# Patient Record
Sex: FEMALE | Race: WHITE | NOT HISPANIC OR LATINO | Employment: STUDENT | URBAN - METROPOLITAN AREA
[De-identification: names, ages, dates, MRNs, and addresses within clinical notes are randomized per-mention and may not be internally consistent; named-entity substitution may affect disease eponyms.]

---

## 2017-02-15 ENCOUNTER — OFFICE VISIT (OUTPATIENT)
Dept: INTERNAL MEDICINE | Facility: CLINIC | Age: 47
End: 2017-02-15
Payer: COMMERCIAL

## 2017-02-15 VITALS
HEART RATE: 84 BPM | WEIGHT: 193 LBS | SYSTOLIC BLOOD PRESSURE: 110 MMHG | HEIGHT: 63 IN | DIASTOLIC BLOOD PRESSURE: 72 MMHG | TEMPERATURE: 98.1 F | BODY MASS INDEX: 34.2 KG/M2

## 2017-02-15 DIAGNOSIS — E78.5 HYPERLIPIDEMIA LDL GOAL <130: ICD-10-CM

## 2017-02-15 DIAGNOSIS — Z82.49 FAMILY HISTORY OF ANEURYSM: ICD-10-CM

## 2017-02-15 PROCEDURE — 99214 OFFICE O/P EST MOD 30 MIN: CPT | Performed by: INTERNAL MEDICINE
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
## 2017-02-15 NOTE — PATIENT INSTRUCTIONS
Continue logging and using your Fit Bit.    - Try to keep your fat intake under 30%.    - Try to increase your protein intake to around 25%   - Switch to Fair Life Milk    - Greek yogurt would be another good protein source  Consider metabolic testing in the future to directly measure your caloric needs. It costs roughly $125.     Continue exercising- Interval training on your elliptical is great.  Add in some strength training: [place] workouts or yoga online for even 5 minutes a day will help.    Schedule with the nutritionist- Kelsey Patricia or Brina Minaya are two here who deal with weight loss. Bring your food log when you meet with them.    I recommend talking to Dr. Ruelas about screening for aneurysm     Follow up in 6-8 weeks.      [facility]    If you have any questions regarding to your visit please contact your care team:     Team Pink:   Clinic Hours Telephone Number   Internal Medicine:  Dr. Thao Smith, NP       7am-7pm  Monday - Thursday   7am-5pm  Fridays  [phone]  (Appointment scheduling available 24/7)    Questions about your visit?  Team Line  [phone]   Urgent Care - [place] and [place] - 11am-9pm Monday-Friday Saturday-Sunday- 9am-5pm   [place] - 5pm-9pm Monday-Friday Saturday-Sunday- 9am-5pm  [phone] - [place]   [phone] - [place]       What options do I have for visits at the clinic other than the traditional office visit?  To expand how we care for you, many of our providers are utilizing electronic visits (e-visits) and telephone visits, when medically appropriate, for interactions with their patients rather than a visit in the clinic.   We also offer nurse visits for many medical concerns. Just like any other service, we will bill your insurance company for this type of visit based on time spent on the phone with your provider. Not all insurance companies cover these visits. Please check with your  medical insurance if this type of visit is covered. You will be responsible for any charges that are not paid by your insurance.      E-visits via [employer]:  generally incur a $35.00 fee.  Telephone visits:  Time spent on the phone: *charged based on time that is spent on the phone in increments of 10 minutes. Estimated cost:   5-10 mins $30.00   11-20 mins. $59.00   21-30 mins. $85.00   Use [employer] (secure email communication and access to your chart) to send your primary care provider a message or make an appointment. Ask someone on your Team how to sign up for [employer].    For a Price Quote for your services, please call our [employer] Price Line at [phone].    As always, Thank you for trusting us with your health care needs!

## 2017-02-15 NOTE — MR AVS SNAPSHOT
After Visit Summary   2/15/2017    Monica Burch    MRN: [id number]           Patient Information     Date Of Birth          1970        Visit Information        Provider Department      2/15/2017 11:30 AM Thao Snell MD [facility]        Today's Diagnoses     BMI 34.0-34.9,adult    -  1    Hyperlipidemia LDL goal <130          Care Instructions    Continue logging and using your Fit Bit.    - Try to keep your fat intake under 30%.    - Try to increase your protein intake to around 25%   - Switch to Fair Life Milk    - Greek yogurt would be another good protein source  Consider metabolic testing in the future to directly measure your caloric needs. It costs roughly $125.     Continue exercising- Interval training on your elliptical is great.  Add in some strength training: [place] workouts or yoga online for even 5 minutes a day will help.    Schedule with the nutritionist- Kelsey Patricia or Brina Minaya are two here who deal with weight loss. Bring your food log when you meet with them.    I recommend talking to Dr. Ruelas about screening for aneurysm     Follow up in 6-8 weeks.      [facility]    If you have any questions regarding to your visit please contact your care team:     Team Pink:   Clinic Hours Telephone Number   Internal Medicine:  Dr. Thao Smith, NP       7am-7pm  Monday - Thursday   7am-5pm  Fridays  [phone]  (Appointment scheduling available 24/7)    Questions about your visit?  Team Line  [phone]   Urgent Care - [place] and [place] - 11am-9pm Monday-Friday Saturday-Sunday- 9am-5pm   [place] - 5pm-9pm Monday-Friday Saturday-Sunday- 9am-5pm  [phone] - [place]  [phone] - [place]       What options do I have for visits at the clinic other than the traditional office visit?  To expand how we care for you, many of our providers are utilizing electronic visits  (e-visits) and telephone visits, when medically appropriate, for interactions with their patients rather than a visit in the clinic.   We also offer nurse visits for many medical concerns. Just like any other service, we will bill your insurance company for this type of visit based on time spent on the phone with your provider. Not all insurance companies cover these visits. Please check with your medical insurance if this type of visit is covered. You will be responsible for any charges that are not paid by your insurance.      E-visits via [employer]:  generally incur a $35.00 fee.  Telephone visits:  Time spent on the phone: *charged based on time that is spent on the phone in increments of 10 minutes. Estimated cost:   5-10 mins $30.00   11-20 mins. $59.00   21-30 mins. $85.00   Use [employer] (secure email communication and access to your chart) to send your primary care provider a message or make an appointment. Ask someone on your Team how to sign up for [employer].    For a Price Quote for your services, please call our [employer] Line at [phone].    As always, Thank you for trusting us with your health care needs!            Follow-ups after your visit        Additional Services     NUTRITION REFERRAL       Your provider [place] you [place] [facility] [place] [facility] [phone]   [email or web address]  [facility] [place] [facility] [phone]   [email or web address]    Please be aware that coverage of these services is subject to the terms and limitations of your health insurance plan.  Call member services at your health plan with any benefit or coverage questions.      Please bring the following with you to your appointment:    (1) This referral request  (2) Any documents given to you regarding this referral  (3) Any specific questions you have about diet and/or food choices                  Who to contact     If you have questions or need  "follow up information about today's clinic visit or your schedule please contact [facility] directly at [phone].  Normal or non-critical lab and imaging results will be communicated to you by MyChart, letter or phone within 4 business days after the clinic has received the results. If you do not hear from us within 7 days, please contact the clinic through [employer] or phone. If you have a critical or abnormal lab result, we will notify you by phone as soon as possible.  Submit refill requests through [employer] or call your pharmacy and they will forward the refill request to us. Please allow 3 business days for your refill to be completed.          Additional Information About Your Visit        [employer] Information     [employer] gives you secure access to your electronic health record. If you see a primary care provider, you can also send messages to your care team and make appointments. If you have questions, please call your primary care clinic.  If you do not have a primary care provider, please call [phone] and they will assist you.        Care EveryWhere ID     This is your Care EveryWhere ID. This could be used by other organizations to access your [place] medical records  [id number]        Your Vitals Were     Pulse Temperature Height BMI (Body Mass Index)          84 98.1  F (36.7  C) (Oral) 5' 3.11\" (1.603 m) 34.07 kg/m2         Blood Pressure from Last 3 Encounters:   02/15/17 110/72   04/30/16 105/66   04/20/16 116/70    Weight from Last 3 Encounters:   02/15/17 193 lb (87.5 kg)   04/29/16 211 lb 10.3 oz (96 kg)   04/20/16 204 lb 14.4 oz (92.9 kg)              We Performed the Following     NUTRITION REFERRAL        Primary Care Provider Office Phone # Fax #    John Ruelas [phone]       [address] [place] [address] [place] [id number]        Thank you!     Thank you for choosing [facility]  for your care. Our goal is always to " provide you with excellent care. Hearing back from our patients is one way we can continue to improve our services. Please take a few minutes to complete the written survey that you may receive in the mail after your visit with us. Thank you!             Your Updated Medication List - Protect others around you: Learn how to safely use, store and throw away your medicines at www.disposemymeds.org.          This list is accurate as of: 2/15/17 12:16 PM.  Always use your most recent med list.                   Brand Name Dispense Instructions for use    enoxaparin 40 MG/0.4ML injection    LOVENOX    2 mL    Inject 0.4 mLs (40 mg) Subcutaneous daily Inject 0.4 mLs (40 mg) Subcutaneous daily for 5 days       hydrOXYzine 25 MG tablet    ATARAX    16 tablet    Take 1 tablet (25 mg) by mouth every 6 hours as needed for other (adjuvant pain)       senna-docusate 8.6-50 MG per tablet    SENOKOT-S;PERICOLACE    30 tablet    Take 1-2 tablets by mouth 2 times daily Take while on oral narcotics to prevent or treat constipation.       ZYRTEC ALLERGY PO      Take 10 mg by mouth daily as needed

## 2017-02-15 NOTE — NURSING NOTE
"Chief Complaint   Patient presents with     Consult     Weight Management       Initial /72 (BP Location: Right arm, Patient Position: Chair, Cuff Size: Adult Large)  Pulse 84  Temp 98.1  F (36.7  C) (Oral)  Ht 5' 3.11\" (1.603 m)  Wt 193 lb (87.5 kg)  BMI 34.07 kg/m2 Estimated body mass index is 34.07 kg/(m^2) as calculated from the following:    Height as of this encounter: 5' 3.11\" (1.603 m).    Weight as of this encounter: 193 lb (87.5 kg).  Medication Reconciliation: complete    "

## 2017-02-16 PROBLEM — Z82.49 FAMILY HISTORY OF ANEURYSM: Status: ACTIVE | Noted: 2017-02-16

## 2017-02-16 PROBLEM — E78.5 HYPERLIPIDEMIA LDL GOAL <130: Status: ACTIVE | Noted: 2017-02-16

## 2017-03-08 ENCOUNTER — OFFICE VISIT (OUTPATIENT)
Dept: SURGERY | Facility: CLINIC | Age: 47
End: 2017-03-08
Payer: COMMERCIAL

## 2017-03-08 VITALS
SYSTOLIC BLOOD PRESSURE: 130 MMHG | HEIGHT: 63 IN | HEART RATE: 78 BPM | DIASTOLIC BLOOD PRESSURE: 86 MMHG | BODY MASS INDEX: 33.66 KG/M2 | WEIGHT: 190 LBS

## 2017-03-08 DIAGNOSIS — K43.2 RECURRENT VENTRAL HERNIA: Primary | ICD-10-CM

## 2017-03-08 PROCEDURE — 99214 OFFICE O/P EST MOD 30 MIN: CPT | Performed by: SURGERY

## 2017-03-08 NOTE — LETTER
2017    RE:  Monica Burch-:  [birth year]    Patient is well known to me from multiple prior hernia repairs. Her last procedure was in 2016 and consisted of a combined open and laparoscopic ventral hernia repair. She returns today out of concern for possible recurrent hernia. She describes several episodes in the recent past of a reducible painful mass in the left upper quadrant. She states that this occurs while bending or twisting or straining to have a bowel movement. It induces a modest amount of discomfort. She is able to feel this and states that it is reducible. This has gotten to the size of greater than a large marble on occasion. She denies fevers or chills. She denies other constitutional symptoms. 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 She continues to feel somewhat limited in her ability to perform physical activity And work towards weight loss. She feels that these pains are also hindering her progress.     In the office she was examined. I examined her in the standing as well as supine positions. I was unable to demonstrate this mass that she describes. The area indicated would be lateral to the previous mesh that was placed and is mixed between two separate old trocar sites.     We discussed in detail her management options. I don't believe that there would be any significant benefit to imaging unless the mass were present at the time of imaging. Other option would be to proceed simply with laparoscopy. Patient is extremely reliable and I believe her when she states she has this reducible mass. We discussed this all in great detail. Her questions were all answered. He is going to take this all into consideration. She will call back later with her decision on how she would like to proceed.    Curt León MD

## 2017-03-08 NOTE — MR AVS SNAPSHOT
After Visit Summary   3/8/2017    Monica Burch    MRN: [id number]           Patient Information     Date Of Birth          1970        Visit Information        Provider Department      3/8/2017 2:15 PM Curt León MD [facility]       Follow-ups after your visit        Your next 10 appointments already scheduled     Apr 10, 2017 10:30 AM CDT   Office Visit with Thao Snell MD   [facility] ([address] [address] [phone]           Bring a current list of meds and any records pertaining to this visit.  For Physicals, please bring immunization records and any forms needing to be filled out.  Please arrive 10 minutes early to complete paperwork.              Who to contact     If you have questions or need follow up information about today's clinic visit or your schedule please contact [facility] directly at [phone].  Normal or non-critical lab and imaging results will be communicated to you by [employer], letter or phone within 4 business days after the clinic has received the results. If you do not hear from us within 7 days, please contact the clinic through [employer] or phone. If you have a critical or abnormal lab result, we will notify you by phone as soon as possible.  Submit refill requests through [employer] or call your pharmacy and they will forward the refill request to us. Please allow 3 business days for your refill to be completed.          Additional Information About Your Visit        [employer] Information     [employer] gives you secure access to your electronic health record. If you see a primary care provider, you can also send messages to your care team and make appointments. If you have questions, please call your primary care clinic.  If you do not have a primary care provider, please call [phone] and they will  "assist you.        Care EveryWhere ID     This is your Care EveryWhere ID. This could be used by other organizations to access your [facility] medical records  [id number]        Your Vitals Were     Pulse Height BMI (Body Mass Index)             78 5' 3\" (1.6 m) 33.66 kg/m2          Blood Pressure from Last 3 Encounters:   03/08/17 130/86   02/15/17 110/72   04/30/16 105/66    Weight from Last 3 Encounters:   03/08/17 190 lb (86.2 kg)   02/15/17 193 lb (87.5 kg)   04/29/16 211 lb 10.3 oz (96 kg)              Today, you had the following     No orders found for display       Primary Care Provider Office Phone # Fax #    John Ruelas [phone]       [address] [place] [address] [place] [id number]        Thank you!     Thank you for choosing [facility]  for your care. Our goal is always to provide you with excellent care. Hearing back from our patients is one way we can continue to improve our services. Please take a few minutes to complete the written survey that you may receive in the mail after your visit with us. Thank you!             Your Updated Medication List - Protect others around you: Learn how to safely use, store and throw away your medicines at www.disposemymeds.org.          This list is accurate as of: 3/8/17  2:22 PM.  Always use your most recent med list.                   Brand Name Dispense Instructions for use    enoxaparin 40 MG/0.4ML injection    LOVENOX    2 mL    Inject 0.4 mLs (40 mg) Subcutaneous daily Inject 0.4 mLs (40 mg) Subcutaneous daily for 5 days       hydrOXYzine 25 MG tablet    ATARAX    16 tablet    Take 1 tablet (25 mg) by mouth every 6 hours as needed for other (adjuvant pain)       senna-docusate 8.6-50 MG per tablet    SENOKOT-S;PERICOLACE    30 tablet    Take 1-2 tablets by mouth 2 times daily Take while on oral narcotics to prevent or treat constipation.       ZYRTEC ALLERGY PO      Take 10 mg by mouth daily as needed " Reported on 3/8/2017

## 2017-03-09 NOTE — PROGRESS NOTES
Patient is well known to me from multiple prior hernia repairs. Her last procedure was in April 2016 and consisted of a combined open and laparoscopic ventral hernia repair.  She returns today out of concern for possible recurrent hernia.  She describes several episodes in the recent past of a reducible painful mass in the left upper quadrant.  She states that this occurs while bending or twisting or straining to have a bowel movement.  It induces a modest amount of discomfort.  She is able to feel this and states that it is reducible.   This has gotten to the size of greater than a large marble on occasion.  She denies fevers or chills.  She denies other constitutional symptoms.  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   She continues to feel somewhat limited in her ability to perform physical activity  And work towards weight loss.  She feels that these pains are also hindering her progress.    In the office she was examined.  I examined her in the standing as well as supine positions.  I was unable to demonstrate this mass that she describes.  The area indicated would be lateral to the previous mesh that was placed and is mixed between two separate old trocar sites.    We discussed in detail her management options.  I don't believe that there would be any significant benefit to imaging unless the mass were present at the time of imaging.  Other option would be to proceed simply with laparoscopy.  Patient is extremely reliable and I believe her when she states she has this reducible mass.  We discussed this all in great detail.  Her questions were all answered.  He is going to take this all into consideration.  She will call back later with her decision on how she would like to proceed.  Total time spent was 25 minutes with greater than 50% in face-to-face consultation.    Please route or send letter to:  Primary Care Provider (PCP)

## 2017-03-14 ENCOUNTER — OFFICE VISIT (OUTPATIENT)
Dept: PEDIATRICS | Facility: CLINIC | Age: 47
End: 2017-03-14
Payer: COMMERCIAL

## 2017-03-14 VITALS
HEART RATE: 79 BPM | TEMPERATURE: 97.8 F | WEIGHT: 193.2 LBS | HEIGHT: 63 IN | OXYGEN SATURATION: 99 % | DIASTOLIC BLOOD PRESSURE: 64 MMHG | SYSTOLIC BLOOD PRESSURE: 116 MMHG | BODY MASS INDEX: 34.23 KG/M2

## 2017-03-14 DIAGNOSIS — Z01.818 PREOP GENERAL PHYSICAL EXAM: Primary | ICD-10-CM

## 2017-03-14 DIAGNOSIS — K43.2 RECURRENT VENTRAL HERNIA: ICD-10-CM

## 2017-03-14 DIAGNOSIS — D68.51 FACTOR V LEIDEN MUTATION (H): ICD-10-CM

## 2017-03-14 DIAGNOSIS — Z23 NEED FOR PROPHYLACTIC VACCINATION AND INOCULATION AGAINST INFLUENZA: ICD-10-CM

## 2017-03-14 LAB
ANION GAP SERPL CALCULATED.3IONS-SCNC: 7 MMOL/L (ref 3–14)
BASOPHILS # BLD AUTO: 0 10E9/L (ref 0–0.2)
BASOPHILS NFR BLD AUTO: 0.3 %
BUN SERPL-MCNC: 19 MG/DL (ref 7–30)
CALCIUM SERPL-MCNC: 8.8 MG/DL (ref 8.5–10.1)
CHLORIDE SERPL-SCNC: 105 MMOL/L (ref 94–109)
CO2 SERPL-SCNC: 28 MMOL/L (ref 20–32)
CREAT SERPL-MCNC: 1.2 MG/DL (ref 0.52–1.04)
DIFFERENTIAL METHOD BLD: NORMAL
EOSINOPHIL # BLD AUTO: 0.4 10E9/L (ref 0–0.7)
EOSINOPHIL NFR BLD AUTO: 4.3 %
ERYTHROCYTE [DISTWIDTH] IN BLOOD BY AUTOMATED COUNT: 13.2 % (ref 10–15)
GFR SERPL CREATININE-BSD FRML MDRD: 48 ML/MIN/1.7M2
GLUCOSE SERPL-MCNC: 87 MG/DL (ref 70–99)
HCT VFR BLD AUTO: 43 % (ref 35–47)
HGB BLD-MCNC: 14.7 G/DL (ref 11.7–15.7)
LYMPHOCYTES # BLD AUTO: 2.1 10E9/L (ref 0.8–5.3)
LYMPHOCYTES NFR BLD AUTO: 20.8 %
MCH RBC QN AUTO: 32.5 PG (ref 26.5–33)
MCHC RBC AUTO-ENTMCNC: 34.2 G/DL (ref 31.5–36.5)
MCV RBC AUTO: 95 FL (ref 78–100)
MONOCYTES # BLD AUTO: 0.4 10E9/L (ref 0–1.3)
MONOCYTES NFR BLD AUTO: 3.7 %
NEUTROPHILS # BLD AUTO: 7.2 10E9/L (ref 1.6–8.3)
NEUTROPHILS NFR BLD AUTO: 70.9 %
PLATELET # BLD AUTO: 286 10E9/L (ref 150–450)
POTASSIUM SERPL-SCNC: 3.5 MMOL/L (ref 3.4–5.3)
RBC # BLD AUTO: 4.53 10E12/L (ref 3.8–5.2)
SODIUM SERPL-SCNC: 140 MMOL/L (ref 133–144)
WBC # BLD AUTO: 10.2 10E9/L (ref 4–11)

## 2017-03-14 PROCEDURE — 90686 IIV4 VACC NO PRSV 0.5 ML IM: CPT | Performed by: FAMILY MEDICINE

## 2017-03-14 PROCEDURE — 36415 COLL VENOUS BLD VENIPUNCTURE: CPT | Performed by: FAMILY MEDICINE

## 2017-03-14 PROCEDURE — 80048 BASIC METABOLIC PNL TOTAL CA: CPT | Performed by: FAMILY MEDICINE

## 2017-03-14 PROCEDURE — 99214 OFFICE O/P EST MOD 30 MIN: CPT | Mod: 25 | Performed by: FAMILY MEDICINE

## 2017-03-14 PROCEDURE — 90471 IMMUNIZATION ADMIN: CPT | Performed by: FAMILY MEDICINE

## 2017-03-14 PROCEDURE — 85025 COMPLETE CBC W/AUTO DIFF WBC: CPT | Performed by: FAMILY MEDICINE

## 2017-03-14 ASSESSMENT — PAIN SCALES - GENERAL: PAINLEVEL: NO PAIN (0)

## 2017-03-14 NOTE — PROGRESS NOTES
[address] [address]  [phone]  Dept: [phone]    PRE-OP EVALUATION:  Today's date: 3/14/2017    Monica Burch (: 1970) presents for pre-operative evaluation assessment as requested by Curt León MD.  She requires evaluation and anesthesia risk assessment prior to undergoing surgery/procedure for treatment of hernia repair.  Proposed procedure: DIAGNOSTIC LAPAROSCOPY, POSSIBLE LEFT UPPER QUADRANT HERNIA WITH MESH     Date of Surgery/ Procedure: 3/28/17  Time of Surgery/ Procedure: 7:30am  Hospital/Surgical Facility: [facility]  Fax number for surgical facility: Available electronically, no need to fax  Primary Physician: John Ruelas  Type of Anesthesia Anticipated: General    Patient has a Health Care Directive or Living Will:  NO    Preop Questions 3/11/2017   1.  Do you have a history of heart attack, stroke, stent, bypass or surgery on an artery in the head, neck, heart or legs? No   2.  Do you ever have any pain or discomfort in your chest? No   3.  Do you have a history of  Heart Failure? No   4.   Are you troubled by shortness of breath when:  walking on a level surface, or up a slight hill, or at night? No   5.  Do you currently have a cold, bronchitis or other respiratory infection? No   6.  Do you have a cough, shortness of breath, or wheezing? No   7.  Do you sometimes get pains in the calves of your legs when you walk? No   8. Do you or anyone in your family have previous history of blood clots? YES - mother and sister factor v leiden mutation   9.  Do you or does anyone in your family have a serious bleeding problem such as prolonged bleeding following surgeries or cuts? No   10. Have you ever had problems with anemia or been told to take iron pills? No   11. Have you had any abnormal blood loss such as black, tarry or bloody stools, or abnormal vaginal bleeding? No   12. Have you ever had a  blood transfusion? No   13. Have you or any of your relatives ever had problems with anesthesia? No   14. Do you have sleep apnea, excessive snoring or daytime drowsiness? No   15. Do you have any prosthetic heart valves? No   16. Do you have prosthetic joints? No   17. Is there any chance that you may be pregnant? No         HPI:                                                            See problem list for active medical problems.  Problems all longstanding and stable, except as noted/documented.  See ROS for pertinent symptoms related to these conditions.                                                                                                  .    MEDICAL HISTORY:                                                      Patient Active Problem List    Diagnosis Date Noted     BMI 34.0-34.9,adult 02/16/2017     Priority: Medium     Hyperlipidemia LDL goal <130 02/16/2017     Priority: Medium     Family history of aneurysm 02/16/2017     Priority: Medium     Hernia of abdominal cavity 04/29/2016     Priority: Medium     Factor V Leiden mutation (H) 09/03/2015     Priority: Medium     Labral tear of hip, left 08/28/2015     Priority: Medium     Chronic fatigue 06/30/2015     Priority: Medium     Chronic constipation 06/30/2015     Priority: Medium     LUQ abdominal pain 06/30/2015     Priority: Medium     Obesity (BMI 30-39.9) 06/30/2015     Priority: Medium     Spigelian hernia 02/06/2015     Priority: Medium     Incisional hernia 02/06/2015     Priority: Medium     Recurrent ventral hernia 05/06/2014     Priority: Medium     Recurrent ventral incisional hernia 05/06/2014     Priority: Medium     CARDIOVASCULAR SCREENING; LDL GOAL LESS THAN 160 12/19/2013     Priority: Medium     Body mass index 31.0-31.9, adult 12/12/2013     Priority: Medium     S/P LEEP (status post loop electrosurgical excision procedure) ZURI 3 09/14/2012     Priority: Medium     8/13/12 pap HSIL.  9/12/12 colposcopy. ZURI 3  11/9/12 LEEP.  ZURI 3 with negative margins  3/12/13 pap NIL  10/16/13 pap LSIL/neg HR HPV  11/8/13 colposcopy. WNL  6/30/15 pap ASCUS + HR HPV. Plan: colposcopy       Donor, kidney, PATIENT DONATED ONE KIDNEY TO HER SISTER , 2009 08/13/2012     Priority: Medium     Family history of factor V Leiden mutation 08/13/2012     Priority: Medium     Do you wish to do the replacement in the background? yes          Past Medical History   Diagnosis Date     ASCUS with positive high risk HPV 6/2015     colp 8/2015 ZURI 1     Coagulation disorder (H)      FACTOR V LEIDEN     Factor V Leiden (H)      no known personal history of thrombosis     History of kidney donation      2009 at the [facility] [place], donetd to her sister      PONV (postoperative nausea and vomiting)      Past Surgical History   Procedure Laterality Date     Kidnay surgery  2009     donated kidney - left     C open rx ankle dislocatn+fixatn Right 2001     Conization  11/9/2012     Procedure: CONIZATION;  CONE BIOPSY, DILATION  AND CURETTAGE;  Surgeon: Xenia Miranda MD;  Location: [facility]     Open reduction internal fixation wrist  3/1/2013     Procedure: OPEN REDUCTION INTERNAL FIXATION WRIST;  OPEN REDUCTION INTERNAL FIXATION RIGHT DISTAL RADIUS ;  Surgeon: Thelma Quinones MD;  Location: [facility]     Hernia repair  2011     at the inccisonal site for kidney surgery      Laparoscopic herniorrhaphy ventral  5/6/2014     Procedure: LAPAROSCOPIC HERNIORRHAPHY VENTRAL;  Surgeon: Curt León MD;  Location: [facility]     Herniorrhaphy ventral  5/6/2014     Procedure: HERNIORRHAPHY VENTRAL;  Surgeon: Curt León MD;  Location: [facility]     Laparoscopic herniorrhaphy ventral Right 2/6/2015     Procedure: LAPAROSCOPIC HERNIORRHAPHY VENTRAL;  Surgeon: Curt León MD;  Location: [facility]     Herniorrhaphy inguinal Left 9/8/2015     Procedure: HERNIORRHAPHY INGUINAL;  Surgeon: Curt León MD;  Location: [facility]     Gi surgery       Kidney donation  2009     Herniorrhaphy ventral N/A 4/29/2016     Procedure: HERNIORRHAPHY VENTRAL;  Surgeon: Curt León MD;  Location: [facility]     Laparoscopic herniorrhaphy ventral N/A 4/29/2016     Procedure: LAPAROSCOPIC HERNIORRHAPHY VENTRAL;  Surgeon: Curt León MD;  Location: [facility]     Current Outpatient Prescriptions   Medication Sig Dispense Refill     enoxaparin (LOVENOX) 40 MG/0.4ML injection Inject 0.4 mLs (40 mg) Subcutaneous daily Inject 0.4 mLs (40 mg) Subcutaneous daily for 5 days 2 mL 0     hydrOXYzine (ATARAX) 25 MG tablet Take 1 tablet (25 mg) by mouth every 6 hours as needed for other (adjuvant pain) (Patient not taking: Reported on 3/8/2017) 16 tablet 1     senna-docusate (SENOKOT-S;PERICOLACE) 8.6-50 MG per tablet Take 1-2 tablets by mouth 2 times daily Take while on oral narcotics to prevent or treat constipation. (Patient not taking: Reported on 3/8/2017) 30 tablet 0     Cetirizine HCl (ZYRTEC ALLERGY PO) Take 10 mg by mouth daily as needed Reported on 3/14/2017       OTC products: None, except as noted above    Allergies   Allergen Reactions     Mold      Qsymia [Phentermine-Topiramate]      Twitching in face     Seasonal Allergies      Adhesive Tape Rash      Latex Allergy: NO    Social History   Substance Use Topics     Smoking status: Never Smoker     Smokeless tobacco: Never Used     Alcohol use Yes      Comment: glass of wine 2 times per week      History   Drug Use No       REVIEW OF SYSTEMS:                                                    C: NEGATIVE for fever, chills, change in weight  I: NEGATIVE for worrisome rashes, moles or lesions  E: NEGATIVE for vision changes or irritation  E/M: NEGATIVE for ear, mouth and throat problems  R: NEGATIVE for significant cough or SOB  B: NEGATIVE for masses, tenderness or discharge  CV: NEGATIVE for chest pain, palpitations or peripheral edema  GI: recurrent ventral hernia  : NEGATIVE for frequency, dysuria, or hematuria  M: NEGATIVE  "for significant arthralgias or myalgia  N: NEGATIVE for weakness, dizziness or paresthesias  E: NEGATIVE for temperature intolerance, skin/hair changes  H: NEGATIVE for bleeding problems  P: NEGATIVE for changes in mood or affect    EXAM:                                                    /64  Pulse 79  Temp 97.8  F (36.6  C) (Oral)  Ht 5' 3\" (1.6 m)  Wt 193 lb 3.2 oz (87.6 kg)  SpO2 99%  BMI 34.22 kg/m2    GENERAL APPEARANCE: healthy, alert and no distress     EYES: EOMI, PERRL     HENT: ear canals and TM's normal and nose and mouth without ulcers or lesions     NECK: no adenopathy, no asymmetry, masses, or scars and thyroid normal to palpation     RESP: lungs clear to auscultation - no rales, rhonchi or wheezes     CV: regular rates and rhythm, normal S1 S2, no S3 or S4 and no murmur, click or rub     ABDOMEN: soft, Minimal tenderness over the periumbilical area with no guarding or rigidity, no organomegaly, normal BS, no costovertebral angle tenderness     MS: extremities normal- no gross deformities noted, no evidence of inflammation in joints, FROM in all extremities.     SKIN: no suspicious lesions or rashes     NEURO: Normal strength and tone, sensory exam grossly normal, mentation intact and speech normal     PSYCH: mentation appears normal. and affect normal/bright     LYMPHATICS: No axillary, cervical, or supraclavicular nodes    DIAGNOSTICS:                                                      Results for orders placed or performed in visit on 03/14/17   CBC with platelets and differential   Result Value Ref Range    WBC 10.2 4.0 - 11.0 10e9/L    RBC Count 4.53 3.8 - 5.2 10e12/L    Hemoglobin 14.7 11.7 - 15.7 g/dL    Hematocrit 43.0 35.0 - 47.0 %    MCV 95 78 - 100 fl    MCH 32.5 26.5 - 33.0 pg    MCHC 34.2 31.5 - 36.5 g/dL    RDW 13.2 10.0 - 15.0 %    Platelet Count 286 150 - 450 10e9/L    Diff Method Automated Method     % Neutrophils 70.9 %    % Lymphocytes 20.8 %    % Monocytes 3.7 %    % " Eosinophils 4.3 %    % Basophils 0.3 %    Absolute Neutrophil 7.2 1.6 - 8.3 10e9/L    Absolute Lymphocytes 2.1 0.8 - 5.3 10e9/L    Absolute Monocytes 0.4 0.0 - 1.3 10e9/L    Absolute Eosinophils 0.4 0.0 - 0.7 10e9/L    Absolute Basophils 0.0 0.0 - 0.2 10e9/L   Basic metabolic panel  (Ca, Cl, CO2, Creat, Gluc, K, Na, BUN)   Result Value Ref Range    Sodium 140 133 - 144 mmol/L    Potassium 3.5 3.4 - 5.3 mmol/L    Chloride 105 94 - 109 mmol/L    Carbon Dioxide 28 20 - 32 mmol/L    Anion Gap 7 3 - 14 mmol/L    Glucose 87 70 - 99 mg/dL    Urea Nitrogen 19 7 - 30 mg/dL    Creatinine 1.20 (H) 0.52 - 1.04 mg/dL    GFR Estimate 48 (L) >60 mL/min/1.7m2    GFR Estimate If Black 58 (L) >60 mL/min/1.7m2    Calcium 8.8 8.5 - 10.1 mg/dL         Recent Labs   Lab Test  04/20/16   1319  08/28/15   1500  07/02/15   0901  02/06/15   1630   10/16/13   1157   03/10/11   1142   03/19/09   0749   HGB  14.3  14.0  14.1   --    < >  14.9   < >  14.7   < >  13.1   PLT  280  284  260  264   --    --    < >  305   --   270   INR   --    --    --    --    --    --    --   0.99   --   1.00   NA   --    --   140   --    --   137   < >  141   --   140   POTASSIUM   --    --   4.2   --    --   4.2   < >  4.2   --   3.9   CR   --    --   0.95  1.04   < >  1.04   < >  1.16*   < >  0.64    < > = values in this interval not displayed.        IMPRESSION:                                                    Reason for surgery/procedure: reCurrent ventral hernia//laparoscopic herniorrhaphy  Diagnosis/reason for consult: Preoperative clearance  ASSESSMENT / PLAN:  (Z01.818) Preop general physical exam  (primary encounter diagnosis)  Comment:   Plan: enoxaparin (LOVENOX) 40 MG/0.4ML injection, CBC        with platelets and differential, Basic         metabolic panel  (Ca, Cl, CO2, Creat, Gluc, K,         Na, BUN)        Patient is cleared for the procedure, will start using Lovenox for 5 days after surgery due to underlying history of factor V Leyden  mutation    (K43.2) Recurrent ventral hernia  Comment:   Plan: enoxaparin (LOVENOX) 40 MG/0.4ML injection, CBC        with platelets and differential, Basic         metabolic panel  (Ca, Cl, CO2, Creat, Gluc, K,         Na, BUN)        as above      (D68.51) Factor V Leiden mutation (H)  Comment:   Plan: enoxaparin (LOVENOX) 40 MG/0.4ML injection        as above          The proposed surgical procedure is considered INTERMEDIATE risk.    REVISED CARDIAC RISK INDEX  The patient has the following serious cardiovascular risks for perioperative complications such as (MI, PE, VFib and 3  AV Block):  No serious cardiac risks  INTERPRETATION: 0 risks: Class I (very low risk - 0.4% complication rate)    The patient has the following additional risks for perioperative complications:  No identified additional risks      ICD-10-CM    1. Preop general physical exam Z01.818 enoxaparin (LOVENOX) 40 MG/0.4ML injection     CBC with platelets and differential     Basic metabolic panel  (Ca, Cl, CO2, Creat, Gluc, K, Na, BUN)   2. Recurrent ventral hernia K43.2 enoxaparin (LOVENOX) 40 MG/0.4ML injection     CBC with platelets and differential     Basic metabolic panel  (Ca, Cl, CO2, Creat, Gluc, K, Na, BUN)   3. Factor V Leiden mutation (H) D68.51 enoxaparin (LOVENOX) 40 MG/0.4ML injection   4. Need for prophylactic vaccination and inoculation against influenza Z23 FLU VAC, SPLIT VIRUS IM > 3 YO (QUADRIVALENT) [77234]     Vaccine Administration, Initial [60874]       RECOMMENDATIONS:                                                          --Patient is to take all scheduled medications on the day of surgery EXCEPT for modifications listed below.    APPROVAL GIVEN to proceed with proposed procedure, without further diagnostic evaluation       Signed Electronically by: Mack Almonte MD    Copy of this evaluation report is provided to requesting physician.    [facility] Preop Guidelines  Chart documentation done in part with [employer]  Voice recognition Software. Although reviewed after completion, some word and grammatical error may remain.

## 2017-03-14 NOTE — PROGRESS NOTES
Injectable Influenza Immunization Documentation    1.  Is the person to be vaccinated sick today?  No    2. Does the person to be vaccinated have an allergy to eggs or to a component of the vaccine?  No    3. Has the person to be vaccinated today ever had a serious reaction to influenza vaccine in the past?  No    4. Has the person to be vaccinated ever had Guillain-Tyler syndrome?  No     Form completed by Elke Santiago CMA

## 2017-03-14 NOTE — PATIENT INSTRUCTIONS
Get the labs today  Get the flu shot today  Before Your Surgery      Call your surgeon if there is any change in your health. This includes signs of a cold or flu (such as a sore throat, runny nose, cough, rash or fever).    Do not smoke, drink alcohol or take over the counter medicine (unless your surgeon or primary care doctor tells you to) for the 24 hours before and after surgery.    If you take prescribed drugs: Follow your doctor s orders about which medicines to take and which to stop until after surgery.    Eating and drinking prior to surgery: follow the instructions from your surgeon    Take a shower or bath the night before surgery. Use the soap your surgeon gave you to gently clean your skin. If you do not have soap from your surgeon, use your regular soap. Do not shave or scrub the surgery site.  Wear clean pajamas and have clean sheets on your bed.

## 2017-03-14 NOTE — PROGRESS NOTES
Monica,  Your test results for hemoglobin is normal.   Your kidney functions were slightly worsened from 2 years ago.This needs monitoring  Please have a recheck on the labs on the day of your surgery.   Let me know if you have any questions. Take care.  Mack Almonte MD

## 2017-03-14 NOTE — NURSING NOTE
"Chief Complaint   Patient presents with     Pre-Op Exam       Initial /64  Pulse 79  Temp 97.8  F (36.6  C) (Oral)  Ht 5' 3\" (1.6 m)  Wt 193 lb 3.2 oz (87.6 kg)  SpO2 99%  BMI 34.22 kg/m2 Estimated body mass index is 34.22 kg/(m^2) as calculated from the following:    Height as of this encounter: 5' 3\" (1.6 m).    Weight as of this encounter: 193 lb 3.2 oz (87.6 kg).  BP completed using cuff size: saroj Santiago, CMA    "

## 2017-03-14 NOTE — MR AVS SNAPSHOT
After Visit Summary   3/14/2017    Monica Burch    MRN: [id number]           Patient Information     Date Of Birth          1970        Visit Information        Provider Department      3/14/2017 1:20 PM Mack Almonte MD [facility]        Today's Diagnoses     Preop general physical exam    -  1    Recurrent ventral hernia        Factor V Leiden mutation (H)          Care Instructions    Get the labs today  Get the flu shot today  Before Your Surgery      Call your surgeon if there is any change in your health. This includes signs of a cold or flu (such as a sore throat, runny nose, cough, rash or fever).    Do not smoke, drink alcohol or take over the counter medicine (unless your surgeon or primary care doctor tells you to) for the 24 hours before and after surgery.    If you take prescribed drugs: Follow your doctor s orders about which medicines to take and which to stop until after surgery.    Eating and drinking prior to surgery: follow the instructions from your surgeon    Take a shower or bath the night before surgery. Use the soap your surgeon gave you to gently clean your skin. If you do not have soap from your surgeon, use your regular soap. Do not shave or scrub the surgery site.  Wear clean pajamas and have clean sheets on your bed.         Follow-ups after your visit        Your next 10 appointments already scheduled     Mar 28, 2017  7:30 AM CDT   [facility] Same Day Surgery with Curt León MD   [facility] ([facility])    [facility] ([facility])   [phone]            Mar 28, 2017   Procedure with Curt León MD   [facility] (--)    [address]  [facility] [address]   [phone]            Apr 10, 2017 10:30 AM CDT   Office Visit with Thao Snell MD   [facility] [place]  [facility]    [address] [address] [phone]           Bring a current list of meds and any records pertaining to this visit.  For Physicals, please bring immunization records and any forms needing to be filled out.  Please arrive 10 minutes early to complete paperwork.              Who to contact     If you have questions or need follow up information about today's clinic visit or your schedule please contact [facility] directly at [phone].  Normal or non-critical lab and imaging results will be communicated to you by iClinicalhart, letter or phone within 4 business days after the clinic has received the results. If you do not hear from us within 7 days, please contact the clinic through [employer] Intellig[employer] or phone. If you have a critical or abnormal lab result, we will notify you by phone as soon as possible.  Submit refill requests through Group-IB or call your pharmacy and they will forward the refill request to us. Please allow 3 business days for your refill to be completed.          Additional Information About Your Visit        Group-IB Information     Group-IB gives you secure access to your electronic health record. If you see a primary care provider, you can also send messages to your care team and make appointments. If you have questions, please call your primary care clinic.  If you do not have a primary care provider, please call [phone] and they will assist you.      Group-IB is an electronic gateway that provides easy, online access to your medical records. With Group-IB, you can request a clinic appointment, read your test results, renew a prescription or communicate with your care team.     To access your existing account, please contact your [facility] or call [phone] for assistance.        Care EveryWhere ID     This is your Care EveryWhere ID. This could be used by other organizations to access your [facility]  "records  [id number]        Your Vitals Were     Pulse Temperature Height Pulse Oximetry BMI (Body Mass Index)       79 97.8  F (36.6  C) (Oral) 5' 3\" (1.6 m) 99% 34.22 kg/m2        Blood Pressure from Last 3 Encounters:   03/14/17 116/64   03/08/17 130/86   02/15/17 110/72    Weight from Last 3 Encounters:   03/14/17 193 lb 3.2 oz (87.6 kg)   03/08/17 190 lb (86.2 kg)   02/15/17 193 lb (87.5 kg)              We Performed the Following     Basic metabolic panel  (Ca, Cl, CO2, Creat, Gluc, K, Na, BUN)     CBC with platelets and differential          Where to get your medicines      These medications were sent to [facility] [id number] [place] [address] [address] [facility]  [address], [facility] [address]     Phone:  [phone]     enoxaparin 40 MG/0.4ML injection          Primary Care Provider Office Phone # Fax #    John Ruelas [phone]       [address] [place]   [facility] [address]        Thank you!     Thank you for choosing [facility]  for your care. Our goal is always to provide you with excellent care. Hearing back from our patients is one way we can continue to improve our services. Please take a few minutes to complete the written survey that you may receive in the mail after your visit with us. Thank you!             Your Updated Medication List - Protect others around you: Learn how to safely use, store and throw away your medicines at www.disposemymeds.org.          This list is accurate as of: 3/14/17  1:32 PM.  Always use your most recent med list.                   Brand Name Dispense Instructions for use    enoxaparin 40 MG/0.4ML injection    LOVENOX    2 mL    Inject 0.4 mLs (40 mg) Subcutaneous daily Inject 0.4 mLs (40 mg) Subcutaneous daily for 5 days       hydrOXYzine 25 MG tablet    ATARAX    16 tablet    Take 1 tablet (25 mg) by mouth every 6 hours as needed for other (adjuvant " pain)       senna-docusate 8.6-50 MG per tablet    SENOKOT-S;PERICOLACE    30 tablet    Take 1-2 tablets by mouth 2 times daily Take while on oral narcotics to prevent or treat constipation.       ZYRTEC ALLERGY PO      Take 10 mg by mouth daily as needed Reported on 3/14/2017

## 2017-03-23 NOTE — H&P (VIEW-ONLY)
[address] [address]  [phone]  Dept: [phone]    PRE-OP EVALUATION:  Today's date: 3/14/2017    Monica Burch (: 1970) presents for pre-operative evaluation assessment as requested by Curt León MD.  She requires evaluation and anesthesia risk assessment prior to undergoing surgery/procedure for treatment of hernia repair.  Proposed procedure: DIAGNOSTIC LAPAROSCOPY, POSSIBLE LEFT UPPER QUADRANT HERNIA WITH MESH     Date of Surgery/ Procedure: 3/28/17  Time of Surgery/ Procedure: 7:30am  Hospital/Surgical Facility: [facility]  Fax number for surgical facility: Available electronically, no need to fax  Primary Physician: John Ruelas  Type of Anesthesia Anticipated: General    Patient has a Health Care Directive or Living Will:  NO    Preop Questions 3/11/2017   1.  Do you have a history of heart attack, stroke, stent, bypass or surgery on an artery in the head, neck, heart or legs? No   2.  Do you ever have any pain or discomfort in your chest? No   3.  Do you have a history of  Heart Failure? No   4.   Are you troubled by shortness of breath when:  walking on a level surface, or up a slight hill, or at night? No   5.  Do you currently have a cold, bronchitis or other respiratory infection? No   6.  Do you have a cough, shortness of breath, or wheezing? No   7.  Do you sometimes get pains in the calves of your legs when you walk? No   8. Do you or anyone in your family have previous history of blood clots? YES - mother and sister factor v leiden mutation   9.  Do you or does anyone in your family have a serious bleeding problem such as prolonged bleeding following surgeries or cuts? No   10. Have you ever had problems with anemia or been told to take iron pills? No   11. Have you had any abnormal blood loss such as black, tarry or bloody stools, or abnormal vaginal bleeding? No   12. Have you ever had a  blood transfusion? No   13. Have you or any of your relatives ever had problems with anesthesia? No   14. Do you have sleep apnea, excessive snoring or daytime drowsiness? No   15. Do you have any prosthetic heart valves? No   16. Do you have prosthetic joints? No   17. Is there any chance that you may be pregnant? No         HPI:                                                            See problem list for active medical problems.  Problems all longstanding and stable, except as noted/documented.  See ROS for pertinent symptoms related to these conditions.                                                                                                  .    MEDICAL HISTORY:                                                      Patient Active Problem List    Diagnosis Date Noted     BMI 34.0-34.9,adult 02/16/2017     Priority: Medium     Hyperlipidemia LDL goal <130 02/16/2017     Priority: Medium     Family history of aneurysm 02/16/2017     Priority: Medium     Hernia of abdominal cavity 04/29/2016     Priority: Medium     Factor V Leiden mutation (H) 09/03/2015     Priority: Medium     Labral tear of hip, left 08/28/2015     Priority: Medium     Chronic fatigue 06/30/2015     Priority: Medium     Chronic constipation 06/30/2015     Priority: Medium     LUQ abdominal pain 06/30/2015     Priority: Medium     Obesity (BMI 30-39.9) 06/30/2015     Priority: Medium     Spigelian hernia 02/06/2015     Priority: Medium     Incisional hernia 02/06/2015     Priority: Medium     Recurrent ventral hernia 05/06/2014     Priority: Medium     Recurrent ventral incisional hernia 05/06/2014     Priority: Medium     CARDIOVASCULAR SCREENING; LDL GOAL LESS THAN 160 12/19/2013     Priority: Medium     Body mass index 31.0-31.9, adult 12/12/2013     Priority: Medium     S/P LEEP (status post loop electrosurgical excision procedure) ZURI 3 09/14/2012     Priority: Medium     8/13/12 pap HSIL.  9/12/12 colposcopy. ZURI 3  11/9/12 LEEP.  ZURI 3 with negative margins  3/12/13 pap NIL  10/16/13 pap LSIL/neg HR HPV  11/8/13 colposcopy. WNL  6/30/15 pap ASCUS + HR HPV. Plan: colposcopy       Donor, kidney, PATIENT DONATED ONE KIDNEY TO HER SISTER , 2009 08/13/2012     Priority: Medium     Family history of factor V Leiden mutation 08/13/2012     Priority: Medium     Do you wish to do the replacement in the background? yes          Past Medical History   Diagnosis Date     ASCUS with positive high risk HPV 6/2015     colp 8/2015 ZURI 1     Coagulation disorder (H)      FACTOR V LEIDEN     Factor V Leiden (H)      no known personal history of thrombosis     [place] of [place] [facility] [place] sister      PONV (postoperative nausea and vomiting)      Past Surgical History   Procedure Laterality Date     Kidnay surgery  2009     donated kidney - left     C open rx ankle dislocatn+fixatn Right 2001     Conization  11/9/2012     Procedure: CONIZATION;  CONE BIOPSY, DILATION  AND CURETTAGE;  Surgeon: Xenia Miranda MD;  Location: [facility]     Open reduction internal fixation wrist  3/1/2013     Procedure: OPEN REDUCTION INTERNAL FIXATION WRIST;  OPEN REDUCTION INTERNAL FIXATION RIGHT DISTAL RADIUS ;  Surgeon: Thelma Quinones MD;  Location: [facility]     Hernia repair  2011     at the inccisonal site for kidney surgery      Laparoscopic herniorrhaphy ventral  5/6/2014     Procedure: LAPAROSCOPIC HERNIORRHAPHY VENTRAL;  Surgeon: Curt León MD;  Location: [facility]     Herniorrhaphy ventral  5/6/2014     Procedure: HERNIORRHAPHY VENTRAL;  Surgeon: Curt León MD;  Location: [facility]     Laparoscopic herniorrhaphy ventral Right 2/6/2015     Procedure: LAPAROSCOPIC HERNIORRHAPHY VENTRAL;  Surgeon: Curt León MD;  Location: [facility]     Herniorrhaphy inguinal Left 9/8/2015     Procedure: HERNIORRHAPHY INGUINAL;  Surgeon: Curt León MD;  Location: [facility]     Gi surgery       Kidney donation  2009     Herniorrhaphy ventral N/A 4/29/2016     Procedure: HERNIORRHAPHY VENTRAL;  Surgeon: Curt León MD;  Location: [facility]     Laparoscopic herniorrhaphy ventral N/A 4/29/2016     Procedure: LAPAROSCOPIC HERNIORRHAPHY VENTRAL;  Surgeon: Curt León MD;  Location: [facility]     Current Outpatient Prescriptions   Medication Sig Dispense Refill     enoxaparin (LOVENOX) 40 MG/0.4ML injection Inject 0.4 mLs (40 mg) Subcutaneous daily Inject 0.4 mLs (40 mg) Subcutaneous daily for 5 days 2 mL 0     hydrOXYzine (ATARAX) 25 MG tablet Take 1 tablet (25 mg) by mouth every 6 hours as needed for other (adjuvant pain) (Patient not taking: Reported on 3/8/2017) 16 tablet 1     senna-docusate (SENOKOT-S;PERICOLACE) 8.6-50 MG per tablet Take 1-2 tablets by mouth 2 times daily Take while on oral narcotics to prevent or treat constipation. (Patient not taking: Reported on 3/8/2017) 30 tablet 0     Cetirizine HCl (ZYRTEC ALLERGY PO) Take 10 mg by mouth daily as needed Reported on 3/14/2017       OTC products: None, except as noted above    Allergies   Allergen Reactions     Mold      Qsymia [Phentermine-Topiramate]      Twitching in face     Seasonal Allergies      Adhesive Tape Rash      Latex Allergy: NO    Social History   Substance Use Topics     Smoking status: Never Smoker     Smokeless tobacco: Never Used     Alcohol use Yes      Comment: glass of wine 2 times per week      History   Drug Use No       REVIEW OF SYSTEMS:                                                    C: NEGATIVE for fever, chills, change in weight  I: NEGATIVE for worrisome rashes, moles or lesions  E: NEGATIVE for vision changes or irritation  E/M: NEGATIVE for ear, mouth and throat problems  R: NEGATIVE for significant cough or SOB  B: NEGATIVE for masses, tenderness or discharge  CV: NEGATIVE for chest pain, palpitations or peripheral edema  GI: recurrent ventral hernia  : NEGATIVE for frequency, dysuria, or hematuria  M: NEGATIVE  "for significant arthralgias or myalgia  N: NEGATIVE for weakness, dizziness or paresthesias  E: NEGATIVE for temperature intolerance, skin/hair changes  H: NEGATIVE for bleeding problems  P: NEGATIVE for changes in mood or affect    EXAM:                                                    /64  Pulse 79  Temp 97.8  F (36.6  C) (Oral)  Ht 5' 3\" (1.6 m)  Wt 193 lb 3.2 oz (87.6 kg)  SpO2 99%  BMI 34.22 kg/m2    GENERAL APPEARANCE: healthy, alert and no distress     EYES: EOMI, PERRL     HENT: ear canals and TM's normal and nose and mouth without ulcers or lesions     NECK: no adenopathy, no asymmetry, masses, or scars and thyroid normal to palpation     RESP: lungs clear to auscultation - no rales, rhonchi or wheezes     CV: regular rates and rhythm, normal S1 S2, no S3 or S4 and no murmur, click or rub     ABDOMEN: soft, Minimal tenderness over the periumbilical area with no guarding or rigidity, no organomegaly, normal BS, no costovertebral angle tenderness     MS: extremities normal- no gross deformities noted, no evidence of inflammation in joints, FROM in all extremities.     SKIN: no suspicious lesions or rashes     NEURO: Normal strength and tone, sensory exam grossly normal, mentation intact and speech normal     PSYCH: mentation appears normal. and affect normal/bright     LYMPHATICS: No axillary, cervical, or supraclavicular nodes    DIAGNOSTICS:                                                      Results for orders placed or performed in visit on 03/14/17   CBC with platelets and differential   Result Value Ref Range    WBC 10.2 4.0 - 11.0 10e9/L    RBC Count 4.53 3.8 - 5.2 10e12/L    Hemoglobin 14.7 11.7 - 15.7 g/dL    Hematocrit 43.0 35.0 - 47.0 %    MCV 95 78 - 100 fl    MCH 32.5 26.5 - 33.0 pg    MCHC 34.2 31.5 - 36.5 g/dL    RDW 13.2 10.0 - 15.0 %    Platelet Count 286 150 - 450 10e9/L    Diff Method Automated Method     % Neutrophils 70.9 %    % Lymphocytes 20.8 %    % Monocytes 3.7 %    % " Eosinophils 4.3 %    % Basophils 0.3 %    Absolute Neutrophil 7.2 1.6 - 8.3 10e9/L    Absolute Lymphocytes 2.1 0.8 - 5.3 10e9/L    Absolute Monocytes 0.4 0.0 - 1.3 10e9/L    Absolute Eosinophils 0.4 0.0 - 0.7 10e9/L    Absolute Basophils 0.0 0.0 - 0.2 10e9/L   Basic metabolic panel  (Ca, Cl, CO2, Creat, Gluc, K, Na, BUN)   Result Value Ref Range    Sodium 140 133 - 144 mmol/L    Potassium 3.5 3.4 - 5.3 mmol/L    Chloride 105 94 - 109 mmol/L    Carbon Dioxide 28 20 - 32 mmol/L    Anion Gap 7 3 - 14 mmol/L    Glucose 87 70 - 99 mg/dL    Urea Nitrogen 19 7 - 30 mg/dL    Creatinine 1.20 (H) 0.52 - 1.04 mg/dL    GFR Estimate 48 (L) >60 mL/min/1.7m2    GFR Estimate If Black 58 (L) >60 mL/min/1.7m2    Calcium 8.8 8.5 - 10.1 mg/dL         Recent Labs   Lab Test  04/20/16   1319  08/28/15   1500  07/02/15   0901  02/06/15   1630   10/16/13   1157   03/10/11   1142   03/19/09   0749   HGB  14.3  14.0  14.1   --    < >  14.9   < >  14.7   < >  13.1   PLT  280  284  260  264   --    --    < >  305   --   270   INR   --    --    --    --    --    --    --   0.99   --   1.00   NA   --    --   140   --    --   137   < >  141   --   140   POTASSIUM   --    --   4.2   --    --   4.2   < >  4.2   --   3.9   CR   --    --   0.95  1.04   < >  1.04   < >  1.16*   < >  0.64    < > = values in this interval not displayed.        IMPRESSION:                                                    Reason for surgery/procedure: reCurrent ventral hernia//laparoscopic herniorrhaphy  Diagnosis/reason for consult: Preoperative clearance  ASSESSMENT / PLAN:  (Z01.818) Preop general physical exam  (primary encounter diagnosis)  Comment:   Plan: enoxaparin (LOVENOX) 40 MG/0.4ML injection, CBC        with platelets and differential, Basic         metabolic panel  (Ca, Cl, CO2, Creat, Gluc, K,         Na, BUN)        Patient is cleared for the procedure, will start using Lovenox for 5 days after surgery due to underlying history of factor V Leyden  mutation    (K43.2) Recurrent ventral hernia  Comment:   Plan: enoxaparin (LOVENOX) 40 MG/0.4ML injection, CBC        with platelets and differential, Basic         metabolic panel  (Ca, Cl, CO2, Creat, Gluc, K,         Na, BUN)        as above      (D68.51) Factor V Leiden mutation (H)  Comment:   Plan: enoxaparin (LOVENOX) 40 MG/0.4ML injection        as above          The proposed surgical procedure is considered INTERMEDIATE risk.    REVISED CARDIAC RISK INDEX  The patient has the following serious cardiovascular risks for perioperative complications such as (MI, PE, VFib and 3  AV Block):  No serious cardiac risks  INTERPRETATION: 0 risks: Class I (very low risk - 0.4% complication rate)    The patient has the following additional risks for perioperative complications:  No identified additional risks      ICD-10-CM    1. Preop general physical exam Z01.818 enoxaparin (LOVENOX) 40 MG/0.4ML injection     CBC with platelets and differential     Basic metabolic panel  (Ca, Cl, CO2, Creat, Gluc, K, Na, BUN)   2. Recurrent ventral hernia K43.2 enoxaparin (LOVENOX) 40 MG/0.4ML injection     CBC with platelets and differential     Basic metabolic panel  (Ca, Cl, CO2, Creat, Gluc, K, Na, BUN)   3. Factor V Leiden mutation (H) D68.51 enoxaparin (LOVENOX) 40 MG/0.4ML injection   4. Need for prophylactic vaccination and inoculation against influenza Z23 FLU VAC, SPLIT VIRUS IM > 3 YO (QUADRIVALENT) [40797]     Vaccine Administration, Initial [70550]       RECOMMENDATIONS:                                                          --Patient is to take all scheduled medications on the day of surgery EXCEPT for modifications listed below.    APPROVAL GIVEN to proceed with proposed procedure, without further diagnostic evaluation       Signed Electronically by: Mack Almonte MD    Copy of this evaluation report is provided to requesting physician.    [facility] Preop Guidelines  Chart documentation done in part with [employer]  Voice recognition Software. Although reviewed after completion, some word and grammatical error may remain.

## 2017-03-27 ENCOUNTER — ANESTHESIA EVENT (OUTPATIENT)
Dept: SURGERY | Facility: CLINIC | Age: 47
End: 2017-03-27
Payer: COMMERCIAL

## 2017-03-28 ENCOUNTER — OFFICE VISIT (OUTPATIENT)
Dept: SURGERY | Facility: PHYSICIAN GROUP | Age: 47
End: 2017-03-28
Payer: COMMERCIAL

## 2017-03-28 ENCOUNTER — ANESTHESIA (OUTPATIENT)
Dept: SURGERY | Facility: CLINIC | Age: 47
End: 2017-03-28
Payer: COMMERCIAL

## 2017-03-28 ENCOUNTER — HOSPITAL ENCOUNTER (OUTPATIENT)
Facility: CLINIC | Age: 47
Discharge: HOME | End: 2017-03-28
Attending: SURGERY | Admitting: SURGERY
Payer: COMMERCIAL

## 2017-03-28 VITALS
WEIGHT: 193.38 LBS | BODY MASS INDEX: 34.27 KG/M2 | SYSTOLIC BLOOD PRESSURE: 104 MMHG | HEIGHT: 63 IN | DIASTOLIC BLOOD PRESSURE: 62 MMHG | RESPIRATION RATE: 14 BRPM | OXYGEN SATURATION: 96 % | TEMPERATURE: 97.1 F

## 2017-03-28 DIAGNOSIS — G89.18 POST-OP PAIN: Primary | ICD-10-CM

## 2017-03-28 PROBLEM — R10.9 ABDOMINAL PAIN: Status: ACTIVE | Noted: 2017-03-28

## 2017-03-28 LAB — HCG SERPL QL: NEGATIVE

## 2017-03-28 PROCEDURE — 27210995 ZZH RX 272: Performed by: SURGERY

## 2017-03-28 PROCEDURE — 25000132 ZZH RX MED GY IP 250 OP 250 PS 637: Performed by: PHYSICIAN ASSISTANT

## 2017-03-28 PROCEDURE — 25000128 H RX IP 250 OP 636: Performed by: ANESTHESIOLOGY

## 2017-03-28 PROCEDURE — 37000009 ZZH ANESTHESIA TECHNICAL FEE, EACH ADDTL 15 MIN: Performed by: SURGERY

## 2017-03-28 PROCEDURE — 40000170 ZZH STATISTIC PRE-PROCEDURE ASSESSMENT II: Performed by: SURGERY

## 2017-03-28 PROCEDURE — 25000125 ZZHC RX 250: Performed by: NURSE ANESTHETIST, CERTIFIED REGISTERED

## 2017-03-28 PROCEDURE — 25000128 H RX IP 250 OP 636: Performed by: SURGERY

## 2017-03-28 PROCEDURE — 71000013 ZZH RECOVERY PHASE 1 LEVEL 1 EA ADDTL HR: Performed by: SURGERY

## 2017-03-28 PROCEDURE — 25000128 H RX IP 250 OP 636: Performed by: NURSE ANESTHETIST, CERTIFIED REGISTERED

## 2017-03-28 PROCEDURE — 37000008 ZZH ANESTHESIA TECHNICAL FEE, 1ST 30 MIN: Performed by: SURGERY

## 2017-03-28 PROCEDURE — 25000125 ZZHC RX 250: Performed by: ANESTHESIOLOGY

## 2017-03-28 PROCEDURE — 25000125 ZZHC RX 250: Performed by: SURGERY

## 2017-03-28 PROCEDURE — 25000566 ZZH SEVOFLURANE, EA 15 MIN: Performed by: SURGERY

## 2017-03-28 PROCEDURE — 84703 CHORIONIC GONADOTROPIN ASSAY: CPT | Performed by: ANESTHESIOLOGY

## 2017-03-28 PROCEDURE — 25800025 ZZH RX 258: Performed by: NURSE ANESTHETIST, CERTIFIED REGISTERED

## 2017-03-28 PROCEDURE — 36415 COLL VENOUS BLD VENIPUNCTURE: CPT | Performed by: ANESTHESIOLOGY

## 2017-03-28 PROCEDURE — 44180 LAP ENTEROLYSIS: CPT | Performed by: SURGERY

## 2017-03-28 PROCEDURE — 36000058 ZZH SURGERY LEVEL 3 EA 15 ADDTL MIN: Performed by: SURGERY

## 2017-03-28 PROCEDURE — 71000027 ZZH RECOVERY PHASE 2 EACH 15 MINS: Performed by: SURGERY

## 2017-03-28 PROCEDURE — 36000056 ZZH SURGERY LEVEL 3 1ST 30 MIN: Performed by: SURGERY

## 2017-03-28 PROCEDURE — 27210794 ZZH OR GENERAL SUPPLY STERILE: Performed by: SURGERY

## 2017-03-28 PROCEDURE — 71000012 ZZH RECOVERY PHASE 1 LEVEL 1 FIRST HR: Performed by: SURGERY

## 2017-03-28 PROCEDURE — 25800025 ZZH RX 258: Performed by: ANESTHESIOLOGY

## 2017-03-28 PROCEDURE — 44180 LAP ENTEROLYSIS: CPT | Mod: AS | Performed by: PHYSICIAN ASSISTANT

## 2017-03-28 RX ORDER — NEOSTIGMINE METHYLSULFATE 1 MG/ML
VIAL (ML) INJECTION PRN
Status: DISCONTINUED | OUTPATIENT
Start: 2017-03-28 | End: 2017-03-28

## 2017-03-28 RX ORDER — BUPIVACAINE HYDROCHLORIDE AND EPINEPHRINE 2.5; 5 MG/ML; UG/ML
INJECTION, SOLUTION INFILTRATION; PERINEURAL PRN
Status: DISCONTINUED | OUTPATIENT
Start: 2017-03-28 | End: 2017-03-28 | Stop reason: HOSPADM

## 2017-03-28 RX ORDER — LIDOCAINE HYDROCHLORIDE 20 MG/ML
INJECTION, SOLUTION INFILTRATION; PERINEURAL PRN
Status: DISCONTINUED | OUTPATIENT
Start: 2017-03-28 | End: 2017-03-28

## 2017-03-28 RX ORDER — PROPOFOL 10 MG/ML
INJECTION, EMULSION INTRAVENOUS PRN
Status: DISCONTINUED | OUTPATIENT
Start: 2017-03-28 | End: 2017-03-28

## 2017-03-28 RX ORDER — MAGNESIUM HYDROXIDE 1200 MG/15ML
LIQUID ORAL PRN
Status: DISCONTINUED | OUTPATIENT
Start: 2017-03-28 | End: 2017-03-28 | Stop reason: HOSPADM

## 2017-03-28 RX ORDER — ONDANSETRON 4 MG/1
4 TABLET, ORALLY DISINTEGRATING ORAL EVERY 30 MIN PRN
Status: DISCONTINUED | OUTPATIENT
Start: 2017-03-28 | End: 2017-03-28 | Stop reason: HOSPADM

## 2017-03-28 RX ORDER — HYDROCODONE BITARTRATE AND ACETAMINOPHEN 5; 325 MG/1; MG/1
1-2 TABLET ORAL EVERY 4 HOURS PRN
Qty: 30 TABLET | Refills: 0 | Status: SHIPPED | OUTPATIENT
Start: 2017-03-28

## 2017-03-28 RX ORDER — FENTANYL CITRATE 0.05 MG/ML
25-50 INJECTION, SOLUTION INTRAMUSCULAR; INTRAVENOUS
Status: DISCONTINUED | OUTPATIENT
Start: 2017-03-28 | End: 2017-03-28 | Stop reason: HOSPADM

## 2017-03-28 RX ORDER — SODIUM CHLORIDE, SODIUM LACTATE, POTASSIUM CHLORIDE, CALCIUM CHLORIDE 600; 310; 30; 20 MG/100ML; MG/100ML; MG/100ML; MG/100ML
INJECTION, SOLUTION INTRAVENOUS CONTINUOUS
Status: DISCONTINUED | OUTPATIENT
Start: 2017-03-28 | End: 2017-03-28 | Stop reason: HOSPADM

## 2017-03-28 RX ORDER — DEXAMETHASONE SODIUM PHOSPHATE 4 MG/ML
INJECTION, SOLUTION INTRA-ARTICULAR; INTRALESIONAL; INTRAMUSCULAR; INTRAVENOUS; SOFT TISSUE PRN
Status: DISCONTINUED | OUTPATIENT
Start: 2017-03-28 | End: 2017-03-28

## 2017-03-28 RX ORDER — MEPERIDINE HYDROCHLORIDE 25 MG/ML
12.5 INJECTION INTRAMUSCULAR; INTRAVENOUS; SUBCUTANEOUS
Status: DISCONTINUED | OUTPATIENT
Start: 2017-03-28 | End: 2017-03-28 | Stop reason: HOSPADM

## 2017-03-28 RX ORDER — GLYCOPYRROLATE 0.2 MG/ML
INJECTION, SOLUTION INTRAMUSCULAR; INTRAVENOUS PRN
Status: DISCONTINUED | OUTPATIENT
Start: 2017-03-28 | End: 2017-03-28

## 2017-03-28 RX ORDER — ONDANSETRON 2 MG/ML
4 INJECTION INTRAMUSCULAR; INTRAVENOUS EVERY 30 MIN PRN
Status: DISCONTINUED | OUTPATIENT
Start: 2017-03-28 | End: 2017-03-28 | Stop reason: HOSPADM

## 2017-03-28 RX ORDER — NALOXONE HYDROCHLORIDE 0.4 MG/ML
.1-.4 INJECTION, SOLUTION INTRAMUSCULAR; INTRAVENOUS; SUBCUTANEOUS
Status: DISCONTINUED | OUTPATIENT
Start: 2017-03-28 | End: 2017-03-28 | Stop reason: HOSPADM

## 2017-03-28 RX ORDER — FENTANYL CITRATE 50 UG/ML
INJECTION, SOLUTION INTRAMUSCULAR; INTRAVENOUS PRN
Status: DISCONTINUED | OUTPATIENT
Start: 2017-03-28 | End: 2017-03-28

## 2017-03-28 RX ORDER — CEFAZOLIN SODIUM 1 G/3ML
1 INJECTION, POWDER, FOR SOLUTION INTRAMUSCULAR; INTRAVENOUS SEE ADMIN INSTRUCTIONS
Status: DISCONTINUED | OUTPATIENT
Start: 2017-03-28 | End: 2017-03-28 | Stop reason: HOSPADM

## 2017-03-28 RX ORDER — SODIUM CHLORIDE, SODIUM LACTATE, POTASSIUM CHLORIDE, CALCIUM CHLORIDE 600; 310; 30; 20 MG/100ML; MG/100ML; MG/100ML; MG/100ML
INJECTION, SOLUTION INTRAVENOUS CONTINUOUS PRN
Status: DISCONTINUED | OUTPATIENT
Start: 2017-03-28 | End: 2017-03-28

## 2017-03-28 RX ORDER — CEFAZOLIN SODIUM 2 G/100ML
2 INJECTION, SOLUTION INTRAVENOUS
Status: COMPLETED | OUTPATIENT
Start: 2017-03-28 | End: 2017-03-28

## 2017-03-28 RX ORDER — ONDANSETRON 2 MG/ML
INJECTION INTRAMUSCULAR; INTRAVENOUS PRN
Status: DISCONTINUED | OUTPATIENT
Start: 2017-03-28 | End: 2017-03-28

## 2017-03-28 RX ORDER — HYDROCODONE BITARTRATE AND ACETAMINOPHEN 5; 325 MG/1; MG/1
1-2 TABLET ORAL
Status: COMPLETED | OUTPATIENT
Start: 2017-03-28 | End: 2017-03-28

## 2017-03-28 RX ORDER — PROPOFOL 10 MG/ML
INJECTION, EMULSION INTRAVENOUS CONTINUOUS PRN
Status: DISCONTINUED | OUTPATIENT
Start: 2017-03-28 | End: 2017-03-28

## 2017-03-28 RX ADMIN — GLYCOPYRROLATE 0.4 MG: 0.2 INJECTION, SOLUTION INTRAMUSCULAR; INTRAVENOUS at 08:08

## 2017-03-28 RX ADMIN — ROCURONIUM BROMIDE 50 MG: 10 INJECTION INTRAVENOUS at 07:24

## 2017-03-28 RX ADMIN — FENTANYL CITRATE 50 MCG: 50 INJECTION, SOLUTION INTRAMUSCULAR; INTRAVENOUS at 08:37

## 2017-03-28 RX ADMIN — SODIUM CHLORIDE, POTASSIUM CHLORIDE, SODIUM LACTATE AND CALCIUM CHLORIDE: 600; 310; 30; 20 INJECTION, SOLUTION INTRAVENOUS at 09:23

## 2017-03-28 RX ADMIN — DEXAMETHASONE SODIUM PHOSPHATE 4 MG: 4 INJECTION, SOLUTION INTRAMUSCULAR; INTRAVENOUS at 07:31

## 2017-03-28 RX ADMIN — SODIUM CHLORIDE, POTASSIUM CHLORIDE, SODIUM LACTATE AND CALCIUM CHLORIDE: 600; 310; 30; 20 INJECTION, SOLUTION INTRAVENOUS at 07:24

## 2017-03-28 RX ADMIN — FENTANYL CITRATE 50 MCG: 50 INJECTION, SOLUTION INTRAMUSCULAR; INTRAVENOUS at 07:26

## 2017-03-28 RX ADMIN — LIDOCAINE HYDROCHLORIDE 60 MG: 20 INJECTION, SOLUTION INFILTRATION; PERINEURAL at 07:23

## 2017-03-28 RX ADMIN — CEFAZOLIN SODIUM 2 G: 2 INJECTION, SOLUTION INTRAVENOUS at 07:31

## 2017-03-28 RX ADMIN — FENTANYL CITRATE 50 MCG: 50 INJECTION, SOLUTION INTRAMUSCULAR; INTRAVENOUS at 07:52

## 2017-03-28 RX ADMIN — HYDROCODONE BITARTRATE AND ACETAMINOPHEN 1 TABLET: 5; 325 TABLET ORAL at 09:49

## 2017-03-28 RX ADMIN — FENTANYL CITRATE 100 MCG: 50 INJECTION, SOLUTION INTRAMUSCULAR; INTRAVENOUS at 07:21

## 2017-03-28 RX ADMIN — ONDANSETRON 4 MG: 2 INJECTION INTRAMUSCULAR; INTRAVENOUS at 08:23

## 2017-03-28 RX ADMIN — PROPOFOL 100 MCG/KG/MIN: 10 INJECTION, EMULSION INTRAVENOUS at 07:23

## 2017-03-28 RX ADMIN — HYDROMORPHONE HYDROCHLORIDE 0.5 MG: 1 INJECTION, SOLUTION INTRAMUSCULAR; INTRAVENOUS; SUBCUTANEOUS at 09:01

## 2017-03-28 RX ADMIN — NEOSTIGMINE METHYLSULFATE 3 MG: 1 INJECTION INTRAMUSCULAR; INTRAVENOUS; SUBCUTANEOUS at 08:08

## 2017-03-28 RX ADMIN — ONDANSETRON 4 MG: 2 INJECTION INTRAMUSCULAR; INTRAVENOUS at 08:05

## 2017-03-28 RX ADMIN — PROPOFOL 150 MG: 10 INJECTION, EMULSION INTRAVENOUS at 07:23

## 2017-03-28 RX ADMIN — MIDAZOLAM HYDROCHLORIDE 2 MG: 1 INJECTION, SOLUTION INTRAMUSCULAR; INTRAVENOUS at 07:20

## 2017-03-28 ASSESSMENT — ENCOUNTER SYMPTOMS: SEIZURES: 0

## 2017-03-28 ASSESSMENT — ACTIVITIES OF DAILY LIVING (ADL)
RETIRED_COMMUNICATION: 0-->UNDERSTANDS/COMMUNICATES WITHOUT DIFFICULTY
FALL_HISTORY_WITHIN_LAST_SIX_MONTHS: NO

## 2017-03-28 ASSESSMENT — LIFESTYLE VARIABLES: TOBACCO_USE: 0

## 2017-03-28 NOTE — IP AVS SNAPSHOT
MRN:[id number]                      After Visit Summary   3/28/2017    Monica Burch    MRN: [id number]           Thank you!     Thank you for choosing [facility] for your care. Our goal is always to provide you with excellent care. Hearing back from our patients is one way we can continue to improve our services. Please take a few minutes to complete the written survey that you may receive in the mail after you visit with us. Thank you!        Patient Information     Date Of Birth          1970        About your hospital stay     You were admitted on:  March 28, 2017 You last received care in the:  [facility] PACU    You were discharged on:  March 28, 2017       Who to Call     For medical emergencies, please call 911.  For non-urgent questions about your medical care, please call your primary care provider or clinic, [phone]  For questions related to your surgery, please call your surgery clinic        Attending Provider     Provider Specialty    Curt León MD General Surgery       Primary Care Provider Office Phone # Fax #    John Ruelas [phone]       [address] [place] [address] [place] [id number]        Your next 10 appointments already scheduled     Apr 10, 2017 10:30 AM CDT   Office Visit with Thao Snell MD   [facility] ([facility])    [address] [place] [id number] [phone]           Bring a current list of meds and any records pertaining to this visit.  For Physicals, please bring immunization records and any forms needing to be filled out.  Please arrive 10 minutes early to complete paperwork.              Further instructions from your care team       [facility]  Discharge Instructions: Post-Operative     DIET    Return to the diet you were on before surgery.    Suggest plenty of liquids and high fiber foods to keep  stools soft.      May take 1 oz. (2 tablespoons) Milk of Magnesia the evening following surgery to encourage bowel movement.    BANDAGE    If you have tape strips, leave them on.  If they become loose, take them off.     Apply ice to incision periodically during the first 48 hours after surgery.    SHOWER    You may shower tomorrow.  Pat the incision dry.    ACTIVITY    You may do what is comfortable.    It is not wise to lift weights, or do anything that requires maximal physical effort for several weeks. Avoid heavy lifting > 20 lbs and strenuous physical activity x 2-3 weeks.       Individual restrictions should be discussed with your surgeon.    You may drive when you are comfortable enough to drive safely and no longer on pain meds.  (Usually 3-4 days.)    WORK      You may return to non-physical work whenever you are comfortable.  (If you can drive, you probably can work.)  Physical work will be decided individually.    PAIN    You may have post-operative pain for several days.    Use the pain medication as directed at your discretion.    Expect gradual resolution of pain over several days.    Do not drink alcohol while you are taking pain medications.    You may take ibuprofen instead of or in addition to your prescription.  If you are taking the maximum amount of your prescription medication, you should not take any additional Tylenol.    EXPECTATIONS    Occasionally with laparoscopic surgery, patients can experience pain into their shoulders or upper back due to gas being trapped in the abdomen. This may take up to 48 hours to subside.    RETURN APPOINTMENT    Please make your post-operative appointment for 10-15 days after surgery.      We recommend making this appointment soon after your surgery date has been confirmed.    CALL OUR CLINICAL OFFICE AT [phone] IF YOU HAVE:    Fever or chills    Drainage from the incision(s)    Significant bleeding    Increasing pain after the first 36 hours    Any  other concerns or questions                            488201 OCT/2009        Same Day Surgery Discharge Instructions for  Sedation and General Anesthesia       It's not unusual to feel dizzy, light-headed or faint for up to 24 hours after surgery or while taking pain medication.  If you have these symptoms: sit for a few minutes before standing and have someone assist you when you get up to walk or use the bathroom.      You should rest and relax for the next 24 hours. We recommend you make arrangements to have an adult stay with you for at least 24 hours after your discharge.  Avoid hazardous and strenuous activity.      DO NOT DRIVE any vehicle or operate mechanical equipment for 24 hours following the end of your surgery.  Even though you may feel normal, your reactions may be affected by the medication you have received.      Do not drink alcoholic beverages for 24 hours following surgery.       Slowly progress to your regular diet as you feel able. It's not unusual to feel nauseated and/or vomit after receiving anesthesia.  If you develop these symptoms, drink clear liquids (apple juice, ginger ale, broth, 7-up, etc. ) until you feel better.  If your nausea and vomiting persists for 24 hours, please notify your surgeon.        All narcotic pain medications, along with inactivity and anesthesia, can cause constipation. Drinking plenty of liquids and increasing fiber intake will help.      For any questions of a medical nature, call your surgeon.      Do not make important decisions for 24 hours.      If you had general anesthesia, you may have a sore throat for a couple of days related to the breathing tube used during surgery.  You may use Cepacol lozenges to help with this discomfort.  If it worsens or if you develop a fever, contact your surgeon.       If you feel your pain is not well managed with the pain medications prescribed by your surgeon, please contact your surgeon's office to let them know so they  "can address your concerns.           Pending Results     No orders found from 3/26/2017 to 3/29/2017.            Admission Information     Date & Time Provider Department Dept. Phone    3/28/2017 Curt León MD [facility] PACU [phone]      Your Vitals Were     Blood Pressure Temperature Respirations Height Weight Last Period    104/62 96.8  F (36  C) 14 1.6 m (5' 3\") 87.7 kg (193 lb 6 oz) 03/20/2017    Pulse Oximetry BMI (Body Mass Index)                96% 34.25 kg/m2          MyChart Information     [employer] gives you secure access to your electronic health record. If you see a primary care provider, you can also send messages to your care team and make appointments. If you have questions, please call your primary care clinic.  If you do not have a primary care provider, please call [phone] and they will assist you.        Care EveryWhere ID     This is your Care EveryWhere ID. This could be used by other organizations to access your [place] medical records  [id number]           Review of your medicines      START taking        Dose / Directions    HYDROcodone-acetaminophen 5-325 MG per tablet   Commonly known as:  NORCO   Used for:  Post-op pain   Notes to Patient:  Had one tab at 9:50 AM        Dose:  1-2 tablet   Take 1-2 tablets by mouth every 4 hours as needed for other (Moderate to Severe Pain)   Quantity:  30 tablet   Refills:  0         CONTINUE these medicines which have NOT CHANGED        Dose / Directions    ZYRTEC ALLERGY PO        Dose:  10 mg   Take 10 mg by mouth daily as needed Reported on 3/14/2017   Refills:  0         STOP taking     diphenhydrAMINE-acetaminophen  MG tablet   Commonly known as:  TYLENOL PM                Where to get your medicines      Some of these will need a paper prescription and others can be bought over the counter. Ask your nurse if you have questions.     Bring a paper prescription for each of these medications     " HYDROcodone-acetaminophen 5-325 MG per tablet                Protect others around you: Learn how to safely use, store and throw away your medicines at www.disposemymeds.org.             Medication List: This is a list of all your medications and when to take them. Check marks below indicate your daily home schedule. Keep this list as a reference.      Medications           Morning Afternoon Evening Bedtime As Needed    HYDROcodone-acetaminophen 5-325 MG per tablet   Commonly known as:  NORCO   Take 1-2 tablets by mouth every 4 hours as needed for other (Moderate to Severe Pain)   Last time this was given:  1 tablet on 3/28/2017  9:49 AM   Notes to Patient:  Had one tab at 9:50 AM                                ZYRTEC ALLERGY PO   Take 10 mg by mouth daily as needed Reported on 3/14/2017

## 2017-03-28 NOTE — ANESTHESIA PREPROCEDURE EVALUATION
Anesthesia Evaluation     . Pt has had prior anesthetic.     History of anesthetic complications   - PONV        ROS/MED HX    ENT/Pulmonary:      (-) tobacco use and sleep apnea   Neurologic:      (-) seizures and CVA   Cardiovascular:        (-) hypertension and OLIVAS   METS/Exercise Tolerance:  >4 METS   Hematologic: Comments: Factor V Leiden        Musculoskeletal:         GI/Hepatic:        (-) GERD and liver disease   Renal/Genitourinary: Comment: Kidney doner        Endo:     (+) Obesity, .   (-) Type II DM, thyroid disease and chronic steroid usage   Psychiatric:         Infectious Disease:        (-) Recent Fever   Malignancy:         Other:                     Physical Exam  Normal systems: cardiovascular, pulmonary and dental    Airway   Mallampati: II  TM distance: >3 FB  Neck ROM: full    Dental     Cardiovascular       Pulmonary                     Anesthesia Plan      History & Physical Review  History and physical reviewed and following examination; no interval change.    ASA Status:  2 .    NPO Status:  > 8 hours    Plan for General and ETT with Propofol induction. Maintenance will be TIVA.    PONV prophylaxis:  Ondansetron (or other 5HT-3) and Dexamethasone or Solumedrol       Postoperative Care  Postoperative pain management:  IV analgesics.      Consents  Anesthetic plan, risks, benefits and alternatives discussed with:  Patient..                          .

## 2017-03-28 NOTE — BRIEF OP NOTE
General Surgery Brief Operative Note    Pre-operative diagnosis: ABDOMINAL PAIN, POSSIBLE HERNIA   Post-operative diagnosis Same   Procedure: Procedure(s):  DIAGNOSTIC LAPAROSCOPY, LAPAROSCOPIC LYSIS OF ADHESIONS     - Wound Class: I-Clean   - Wound Class: I-Clean    Surgeon(s), Assistant(s): Surgeon(s) and Role:     * Curt León MD - Primary     * Alfreda Hutchinson PA-C - Assisting   Estimated blood loss: 10 mL   Drains: None   Specimens: * No specimens in log *   Findings: Adhesions, no evidence of hernia   Condition: Stable   Comments:      Alfreda Hutchinson PA-C See dictated operative report for full details

## 2017-03-28 NOTE — DISCHARGE INSTRUCTIONS
[facility]  Discharge Instructions: Post-Operative     DIET    Return to the diet you were on before surgery.    Suggest plenty of liquids and high fiber foods to keep stools soft.      May take 1 oz. (2 tablespoons) Milk of Magnesia the evening following surgery to encourage bowel movement.    BANDAGE    If you have tape strips, leave them on.  If they become loose, take them off.     Apply ice to incision periodically during the first 48 hours after surgery.    SHOWER    You may shower tomorrow.  Pat the incision dry.    ACTIVITY    You may do what is comfortable.    It is not wise to lift weights, or do anything that requires maximal physical effort for several weeks. Avoid heavy lifting > 20 lbs and strenuous physical activity x 2-3 weeks.       Individual restrictions should be discussed with your surgeon.    You may drive when you are comfortable enough to drive safely and no longer on pain meds.  (Usually 3-4 days.)    WORK      You may return to non-physical work whenever you are comfortable.  (If you can drive, you probably can work.)  Physical work will be decided individually.    PAIN    You may have post-operative pain for several days.    Use the pain medication as directed at your discretion.    Expect gradual resolution of pain over several days.    Do not drink alcohol while you are taking pain medications.    You may take ibuprofen instead of or in addition to your prescription.  If you are taking the maximum amount of your prescription medication, you should not take any additional Tylenol.    EXPECTATIONS    Occasionally with laparoscopic surgery, patients can experience pain into their shoulders or upper back due to gas being trapped in the abdomen. This may take up to 48 hours to subside.    RETURN APPOINTMENT    Please make your post-operative appointment for 10-15 days after surgery.      We recommend making this appointment soon after your surgery date  has been confirmed.    CALL OUR CLINICAL OFFICE AT [phone] IF YOU HAVE:    Fever or chills    Drainage from the incision(s)    Significant bleeding    Increasing pain after the first 36 hours    Any other concerns or questions                            603014 OCT/2009        Same Day Surgery Discharge Instructions for  Sedation and General Anesthesia       It's not unusual to feel dizzy, light-headed or faint for up to 24 hours after surgery or while taking pain medication.  If you have these symptoms: sit for a few minutes before standing and have someone assist you when you get up to walk or use the bathroom.      You should rest and relax for the next 24 hours. We recommend you make arrangements to have an adult stay with you for at least 24 hours after your discharge.  Avoid hazardous and strenuous activity.      DO NOT DRIVE any vehicle or operate mechanical equipment for 24 hours following the end of your surgery.  Even though you may feel normal, your reactions may be affected by the medication you have received.      Do not drink alcoholic beverages for 24 hours following surgery.       Slowly progress to your regular diet as you feel able. It's not unusual to feel nauseated and/or vomit after receiving anesthesia.  If you develop these symptoms, drink clear liquids (apple juice, ginger ale, broth, 7-up, etc. ) until you feel better.  If your nausea and vomiting persists for 24 hours, please notify your surgeon.        All narcotic pain medications, along with inactivity and anesthesia, can cause constipation. Drinking plenty of liquids and increasing fiber intake will help.      For any questions of a medical nature, call your surgeon.      Do not make important decisions for 24 hours.      If you had general anesthesia, you may have a sore throat for a couple of days related to the breathing tube used during surgery.  You may use Cepacol lozenges to help with this discomfort.  If it worsens or if  you develop a fever, contact your surgeon.       If you feel your pain is not well managed with the pain medications prescribed by your surgeon, please contact your surgeon's office to let them know so they can address your concerns.

## 2017-03-28 NOTE — ANESTHESIA CARE TRANSFER NOTE
Patient: Monica Burch    Procedure(s):  DIAGNOSTIC LAPAROSCOPY, LAPAROSCOPIC LYSIS OF ADHESIONS     - Wound Class: I-Clean   - Wound Class: I-Clean    Diagnosis: ABDOMINAL PAIN, POSSIBLE HERNIA  Diagnosis Additional Information: No value filed.    Anesthesia Type:   General     Note:  Airway :Face Mask  Patient transferred to:PACU  Comments: Pt to PACU, face mask on, spont respirations, vss. Report to RN      Vitals: (Last set prior to Anesthesia Care Transfer)    CRNA VITALS  3/28/2017 0755 - 3/28/2017 0828      3/28/2017             EKG: Sinus rhythm                Electronically Signed By: LUIS ANGEL Rincon CRNA  March 28, 2017  8:28 AM

## 2017-03-28 NOTE — ANESTHESIA POSTPROCEDURE EVALUATION
Patient: Monica Burch    Procedure(s):  DIAGNOSTIC LAPAROSCOPY, LAPAROSCOPIC LYSIS OF ADHESIONS     - Wound Class: I-Clean   - Wound Class: I-Clean    Diagnosis:ABDOMINAL PAIN, POSSIBLE HERNIA  Diagnosis Additional Information: No value filed.    Anesthesia Type:  General    Note:  Anesthesia Post Evaluation    Patient location during evaluation: PACU  Patient participation: Able to fully participate in evaluation  Level of consciousness: awake and alert  Pain management: satisfactory to patient  Airway patency: patent  Cardiovascular status: hemodynamically stable  Respiratory status: acceptable and unassisted  Hydration status: balanced  PONV: none     Anesthetic complications: None          Last vitals:  Vitals:    03/28/17 0930 03/28/17 0945 03/28/17 0949   BP: 110/77 104/62    Resp: 16 14    Temp:   36.2  C (97.1  F)   SpO2: 100% 96%          Electronically Signed By: Jarvis Ydoer MD  March 28, 2017  1:47 PM

## 2017-03-28 NOTE — OP NOTE
DATE OF PROCEDURE:  03/28/2017      PREOPERATIVE DIAGNOSIS:  Left upper quadrant and left lower abdominal wall pain with questionable reducible mass, history of multiple prior hernia repairs.      POSTOPERATIVE DIAGNOSES:     1.  Left upper quadrant and left lower abdominal wall pain with questionable reducible mass, history of multiple prior hernia repairs.     2.  No identifiable recurrent ventral hernia and intra-abdominal adhesions.      PROCEDURES:   1.  Diagnostic laparoscopy.   2.  Lysis of intra-abdominal adhesions.      SURGEONS:  Curt León MD      ASSISTANT:  Alfreda Hutchinson PA-C      ANESTHESIA:  General endotracheal.      ESTIMATED BLOOD LOSS:  10 mL.      DRAINS:  None.      COMPLICATIONS:  None.      SPECIMENS:  None.      INDICATIONS:  Monica Burch is a 46-year-old female known to me from multiple prior ventral/abdominal wall hernia repairs.  She presented to my office recently with what she described as a reducible mass in her left upper quadrant pain.  She also had had some pain in her left lower quadrant.  Due to her relatively precise description of what sounded like a recurrent hernia we discussed management options.  We ultimately elected to proceed with laparoscopy.  The potential risks of bleeding, infection, visceral injury, recurrent hernia or ongoing pain were all reviewed in detail.  Questions were all answered and she wished to proceed.      DESCRIPTION OF PROCEDURE:  After informed consent was obtained, the patient was taken to the operating room and placed supine on the operating table.  Following the induction of adequate general endotracheal anesthesia, the patient's abdomen was prepped and draped in the usual fashion.  Surgeon initiated timeout was then completed and IV antibiotics were administered.  A stab incision was made in the right lateral abdomen through which a 5 mm Optiview trocar was used to gain access to the peritoneal cavity.  A carbon dioxide pneumoperitoneum was  then established.  A 5 mm 30 degree laparoscope was advanced through the trocar.  There were multiple anterior abdominal wall adhesions particularly in the upper abdomen as well as some loops of bowel adhesions in the lower abdomen.  Under direct vision, a 5 mm port was placed in the right lower abdomen.  I could then visualize across the left side.  An additional 5 mm port was placed in the left lower abdomen.  Specifically, the hernia mesh that had been placed in the left lateral abdomen appeared to be intact with no evidence of recurrent hernia.  There was a solitary loop of small bowel adherent to the mesh in the right mid abdomen that was taken down sharply.  There was no evidence of visceral injury.  In the upper abdomen, there were modest omental adhesions to the prior meshes that had been used for her previous hernia repairs.  A combination of blunt as well as electrocautery dissection was used to completely take down all these adhesions completely exposing the prior hernia repairs.  After all the adhesions were taken down, there was no evidence of any type of abdominal wall defect.  There was no evidence of recurrent hernia.  The left lower abdomen was also visualized.  There were no significant intra-abdominal adhesions in the left lower abdomen, no evidence of hernia.  We therefore at this point elected to conclude the procedure.  Trocars were removed.  Carbon dioxide was massaged from the abdomen.  Local anesthetic was injected and the skin incisions were closed with subcuticular 4-0 Vicryl stitches.  Steri-Strips and appropriate dressings were applied.  The patient tolerated this without difficulty.  She was awakened in the operating room, extubated and taken to recovery room in stable condition.  Physician's assistant first assist was necessary for the performance this procedure due to expertise in patient positioning, prepping, draping, camera operation, exposure and visualization, incisional closure.          KARL JOHNSON MD             D: 2017 12:16   T: 2017 17:36   MT: [id number]      Name:     RASHAD DARDEN   MRN:      [id number]        Account:        [id number]   :      1970           Procedure Date: 2017      Document: [id number]

## 2017-03-28 NOTE — IP AVS SNAPSHOT
[address], [address]    [facility] [address]    Phone:  [phone]                                       After Visit Summary   3/28/2017    Monica Burch    MRN: [id number]           After Visit Summary Signature Page     I have received my discharge instructions, and my questions have been answered. I have discussed any challenges I see with this plan with the nurse or doctor.    ..........................................................................................................................................  Patient/Patient Representative Signature      ..........................................................................................................................................  Patient Representative Print Name and Relationship to Patient    ..................................................               ................................................  Date                                            Time    ..........................................................................................................................................  Reviewed by Signature/Title    ...................................................              ..............................................  Date                                                            Time

## 2017-03-28 NOTE — PROGRESS NOTES
Admission medication history interview status for the 3/28/2017  admission is complete. See EPIC admission navigator for prior to admission medications     Medication history source reliability:Good    Medication history interview source(s):Patient    Medication history resources (including written lists, pill bottles, clinic record):None    Primary pharmacy.[facility]    Additional medication history information not noted on PTA med list :None    Time spent in this activity: 45 minutes    Prior to Admission medications    Medication Sig Last Dose Taking? Auth Provider   diphenhydrAMINE-acetaminophen (TYLENOL PM)  MG tablet Take 1 tablet by mouth nightly as needed for sleep 3/24/2017 at prn Yes Reported, Patient   Cetirizine HCl (ZYRTEC ALLERGY PO) Take 10 mg by mouth daily as needed Reported on 3/14/2017 more than a week at prn  Reported, Patient

## 2017-04-10 ENCOUNTER — OFFICE VISIT (OUTPATIENT)
Dept: SURGERY | Facility: CLINIC | Age: 47
End: 2017-04-10
Payer: COMMERCIAL

## 2017-04-10 DIAGNOSIS — Z09 SURGERY FOLLOW-UP EXAMINATION: Primary | ICD-10-CM

## 2017-04-10 PROCEDURE — 99024 POSTOP FOLLOW-UP VISIT: CPT | Performed by: PHYSICIAN ASSISTANT

## 2017-04-10 NOTE — PROGRESS NOTES
[facility]     CC: Post-op check     Subjective:  Monica Burch is here for her first postoperative visit. She underwent a diagnostic laparoscopy and lysis of intra-abdominal adhesions by Dr. León  on 3/29/17.     Today she  tells me she is doing quite well but is still experiencing the same issues as pre-operatively. Specifically, she feels something just left of her midline incision that is movable, possibly reducible.  For this reason, it behaves similar to a hernia.  Denies much pain with this but notes little to no change in symptoms since surgery.    She currently does not need any pain medications and she has no further concerns today.    Objective:  Abd - Abdomen soft, non-tender. BS normal. No masses, organomegaly.  I can feel a ridge along this left side of the incision, but cannot appreciate a defect, and cannot make this move.  This is more firm than the rest of her abdomen.  Inc - Healing well, well approximated and without signs of infection      Assessment:  S/p Dx Laparoscopy and IVETTE. No hernias noted in surgery.     Plan:  Was able to curbside Dr. León, who again confirmed no hernias noted at time of surgery.  Adhesions were taken down and abdominal wall well visualized.  He offered PT, but very little more than we can do from a surgical standpoint.  Discussed with patient and she understands, though, understandably is frustrated by this news.  She will consider PT offer and let us know.  Will follow up with Dr. León if any further concerns.    Patient was instructioned to call if fever, increasing pain, redness or drainage at the incision sites occurs.  Otherwise she will follow up with Dr John Ruelas for her regular medical needs.    Raul Buck PA-C  [phone]    Please route or send letter to:  Primary Care Provider (PCP)

## 2017-04-10 NOTE — MR AVS SNAPSHOT
After Visit Summary   4/10/2017    Monica Burch    MRN: [id number]           Patient Information     Date Of Birth          1970        Visit Information        Provider Department      4/10/2017 1:00 PM Raul Buck PA-C [facility] General Surgery      Today's Diagnoses     Surgery follow-up examination    -  1       Follow-ups after your visit        Who to contact     If you have questions or need follow up information about today's clinic visit or your schedule please contact [facility] directly at [phone].  Normal or non-critical lab and imaging results will be communicated to you by [employer], letter or phone within 4 business days after the clinic has received the results. If you do not hear from us within 7 days, please contact the clinic through [employer] or phone. If you have a critical or abnormal lab result, we will notify you by phone as soon as possible.  Submit refill requests through [employer] or call your pharmacy and they will forward the refill request to us. Please allow 3 business days for your refill to be completed.          Additional Information About Your Visit        MyChart Information     [employer] gives you secure access to your electronic health record. If you see a primary care provider, you can also send messages to your care team and make appointments. If you have questions, please call your primary care clinic.  If you do not have a primary care provider, please call [phone] and they will assist you.        Care EveryWhere ID     This is your Care EveryWhere ID. This could be used by other organizations to access your [facility] medical records  [id number]        Your Vitals Were     Last Period                   03/20/2017            Blood Pressure from Last 3 Encounters:   03/28/17 104/62   03/14/17 116/64   03/08/17 130/86    Weight from Last 3 Encounters:   03/28/17 193 lb 6 oz (87.7  kg)   03/14/17 193 lb 3.2 oz (87.6 kg)   03/08/17 190 lb (86.2 kg)              Today, you had the following     No orders found for display       Primary Care Provider Office Phone # Fax #    John Ruelas [phone]       [address]   [facility] [address]        Thank you!     Thank you for choosing [facility]  for your care. Our goal is always to provide you with excellent care. Hearing back from our patients is one way we can continue to improve our services. Please take a few minutes to complete the written survey that you may receive in the mail after your visit with us. Thank you!             Your Updated Medication List - Protect others around you: Learn how to safely use, store and throw away your medicines at www.disposemymeds.org.          This list is accurate as of: 4/10/17  1:28 PM.  Always use your most recent med list.                   Brand Name Dispense Instructions for use    HYDROcodone-acetaminophen 5-325 MG per tablet    NORCO    30 tablet    Take 1-2 tablets by mouth every 4 hours as needed for other (Moderate to Severe Pain)       ZYRTEC ALLERGY PO      Take 10 mg by mouth daily as needed Reported on 3/14/2017

## 2017-04-10 NOTE — LETTER
April 10, 2017      [facility]     RE:  Monica Burch-:  66     CC: Post-op check      Subjective:  Monica Burch is here for her first postoperative visit. She underwent a diagnostic laparoscopy and lysis of intra-abdominal adhesions by Dr. León  on 3/29/17.      Today she tells me she is doing quite well but is still experiencing the same issues as pre-operatively. Specifically, she feels something just left of her midline incision that is movable, possibly reducible. For this reason, it behaves similar to a hernia. Denies much pain with this but notes little to no change in symptoms since surgery.     She currently does not need any pain medications and she has no further concerns today.     Objective:  Abd - Abdomen soft, non-tender. BS normal. No masses, organomegaly. I can feel a ridge along this left side of the incision, but cannot appreciate a defect, and cannot make this move. This is more firm than the rest of her abdomen.  Inc - Healing well, well approximated and without signs of infection     Assessment:  S/p Dx Laparoscopy and IVETTE. No hernias noted in surgery.      Plan:  Was able to curbside Dr. León, who again confirmed no hernias noted at time of surgery. Adhesions were taken down and abdominal wall well visualized. He offered PT, but very little more than we can do from a surgical standpoint. Discussed with patient and she understands, though, understandably is frustrated by this news. She will consider PT offer and let us know. Will follow up with Dr. León if any further concerns.     Patient was instructioned to call if fever, increasing pain, redness or drainage at the incision sites occurs. Otherwise she will follow up with Dr John Ruelas for her regular medical needs.     Raul Buck PA-C

## 2017-09-24 ENCOUNTER — HEALTH MAINTENANCE LETTER (OUTPATIENT)
Age: 47
End: 2017-09-24

## 2018-09-03 ENCOUNTER — HEALTH MAINTENANCE LETTER (OUTPATIENT)
Age: 48
End: 2018-09-03

## 2018-10-01 ENCOUNTER — HEALTH MAINTENANCE LETTER (OUTPATIENT)
Age: 48
End: 2018-10-01

## 2018-10-08 ENCOUNTER — HEALTH MAINTENANCE LETTER (OUTPATIENT)
Age: 48
End: 2018-10-08

## 2019-11-09 ENCOUNTER — HEALTH MAINTENANCE LETTER (OUTPATIENT)
Age: 49
End: 2019-11-09

## 2020-02-23 ENCOUNTER — HEALTH MAINTENANCE LETTER (OUTPATIENT)
Age: 50
End: 2020-02-23

## 2020-12-06 ENCOUNTER — HEALTH MAINTENANCE LETTER (OUTPATIENT)
Age: 50
End: 2020-12-06

## 2021-09-26 ENCOUNTER — HEALTH MAINTENANCE LETTER (OUTPATIENT)
Age: 51
End: 2021-09-26

## 2022-01-16 ENCOUNTER — HEALTH MAINTENANCE LETTER (OUTPATIENT)
Age: 52
End: 2022-01-16

## 2022-09-08 ENCOUNTER — OFFICE VISIT (OUTPATIENT)
Dept: SURGERY | Facility: CLINIC | Age: 52
End: 2022-09-08
Payer: COMMERCIAL

## 2022-09-08 VITALS
DIASTOLIC BLOOD PRESSURE: 80 MMHG | SYSTOLIC BLOOD PRESSURE: 122 MMHG | HEIGHT: 64 IN | WEIGHT: 186 LBS | OXYGEN SATURATION: 98 % | HEART RATE: 96 BPM | RESPIRATION RATE: 16 BRPM | BODY MASS INDEX: 31.76 KG/M2

## 2022-09-08 DIAGNOSIS — Z87.19 STATUS POST HERNIA REPAIR: ICD-10-CM

## 2022-09-08 DIAGNOSIS — R10.32 LEFT GROIN PAIN: Primary | ICD-10-CM

## 2022-09-08 DIAGNOSIS — Z98.890 STATUS POST HERNIA REPAIR: ICD-10-CM

## 2022-09-08 DIAGNOSIS — R10.32 LEFT LOWER QUADRANT PAIN: Primary | ICD-10-CM

## 2022-09-08 PROCEDURE — 99243 OFF/OP CNSLTJ NEW/EST LOW 30: CPT | Performed by: SURGERY

## 2022-09-08 RX ORDER — SEMAGLUTIDE 1.34 MG/ML
INJECTION, SOLUTION SUBCUTANEOUS
COMMUNITY
Start: 2022-06-23

## 2022-09-08 NOTE — PROGRESS NOTES
[facility]  Surgery Consultation    CONSULTATION REQUESTED BY:  SurajJohn [phone]    HPI: This patient is a now 52-year-old female known to me from multiple prior surgeries.  She previously underwent donor nephrectomy at an outside institution and then further underwent primary abdominal wall hernia repair at the [facility] in 2011.  In 2014 I performed a laparoscopic repair of a recurrent left lateral trocar site hernia as well as a retrorectus ventral/incisional hernia repair.  In 2015 she had a trocar site hernia on the right abdomen that was repaired laparoscopically.  In 2016 she had a combined open and laparoscopic ventral hernia repair along the midline.  This was also associated with a significant diastases recti.  When I last saw her was in 2017 at which time she came in out of concern for possible recurrence of hernia due to abdominal pain and a questionable recurrent bulge.  She underwent diagnostic laparoscopy with lysis of adhesions and no recurrence of hernia was noted.  She is referred by her primary care today due to left groin pain.  She describes having pain in her left groin particularly after prolonged sitting or standing.  She also feels discomfort when getting in and out of a car.  She is occasionally pain-free primarily at rest or when lying flat.  She feels like she may note a bulge on occasion.  At its worst the pain is 7 out of 10.  She has had no disruption in normal GI function.  She has been working diligently on weight loss and is currently taking weight loss medication.  She describes no radiation of pain into the leg or medial thigh.  She does also have a prior history of left hip arthroscopy.    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  PSH:    has a past surgical history that includes OPEN RX  "ANKLE DISLOCATN+FIXATN (Right, 2001); [place] (11/9/2012); Open reduction internal fixation wrist (3/1/2013); hernia repair (2011); Laparoscopic herniorrhaphy ventral (5/6/2014); Herniorrhaphy ventral (5/6/2014); Laparoscopic herniorrhaphy ventral (Right, 2/6/2015); Herniorrhaphy inguinal (Left, 9/8/2015); GI surgery; Herniorrhaphy ventral (N/A, 4/29/2016); Laparoscopic herniorrhaphy ventral (N/A, 4/29/2016); Laparoscopy diagnostic (general) (N/A, 3/28/2017); and Laparoscopic lysis adhesions (N/A, 3/28/2017).  Social History:   reports that she has never smoked. She has never used smokeless tobacco. She reports current alcohol use. She reports that she does not use drugs.  Family History:  family history includes Aneurysm (age of onset: 40) in her sister; Blood Disease in her mother; Diabetes in her sister; Heart Disease in her mother; Lipids in her mother; Other Cancer (age of onset: 54) in her maternal aunt; Thrombophilia in her mother and sister.  Medications/Allergies: Home medications and allergies reviewed.    ROS:  The 10 point Review of Systems is negative other than noted in the HPI.    Physical Exam:  /80   Pulse 96   Resp 16   Ht 1.626 m (5' 4\")   Wt 84.4 kg (186 lb)   SpO2 98%   BMI 31.93 kg/m    GENERAL: Generally appears well.  Psych: Alert and Oriented.  Normal affect  Eyes: Sclera clear  Respiratory:  Lungs clear to ausculation bilaterally with good air excursion  Cardiovascular:  Regular Rate and Rhythm with no murmurs gallops or rubs, normal peripheral pulses  GI: Abdomen Non Distended Soft Non-Tender  No hernias palpated..  Groin- I examined the patient in both the standing and supine positions. Right Groin- No hernia Palpated.  No tenderness on palpation. Left Groin- No hernia Palpated.  Tenderness palpated in groin.her indicated area of greatest discomfort appears to be within the inguinal canal on the left as well as to a greater degree below the inguinal " ligament.  Lymphatic/Hematologic/Immune:  No femoral or cervical lymphadenopathy.  Integumentary:  No rashes  Neurological: grossly intact     All new lab and imaging data was reviewed.     Impression and Plan:  Patient is a 52 year old female with left groin pain with history of multiple prior surgeries.  No evidence of hernia on exam.    PLAN: I discussed with her her management options.  I Shauna send her for a noncontrast CT of the abdomen pelvis to assess abdominal wall anatomy.  She does also have a prior history of left hip surgery and given the location of her pain I think that there could be some underlying intrinsic hip pathology.  I have suggested that she follow-up with her prior hip surgeon.  Pending the outcome of the CT scan additional recommendations may follow.  She felt comfortable with these recommendations.    Thank you very much for this consult.    Curt León M.D.  [facility]  [phone]    Please route or send letter to:  Primary Care Provider (PCP) and Referring Provider

## 2022-10-04 ENCOUNTER — ANCILLARY PROCEDURE (OUTPATIENT)
Dept: CT IMAGING | Facility: CLINIC | Age: 52
End: 2022-10-04
Attending: SURGERY
Payer: COMMERCIAL

## 2022-10-04 DIAGNOSIS — Z98.890 STATUS POST HERNIA REPAIR: ICD-10-CM

## 2022-10-04 DIAGNOSIS — R10.32 LEFT LOWER QUADRANT PAIN: ICD-10-CM

## 2022-10-04 DIAGNOSIS — Z87.19 STATUS POST HERNIA REPAIR: ICD-10-CM

## 2022-10-04 PROCEDURE — 74176 CT ABD & PELVIS W/O CONTRAST: CPT

## 2022-10-12 NOTE — RESULT ENCOUNTER NOTE
Result reviewed, at this time I see no evidence for recurrence of any manner of abdominal wall hernia.  Previous hernia repairs and mesh appear to be in good positions.  I do not have any explanation for the discomfort in the left lower abdomen/groin.  If this is severe enough consideration of pain management or Ortho evaluation given history of prior hip surgery

## 2022-10-14 ENCOUNTER — TELEPHONE (OUTPATIENT)
Dept: SURGERY | Facility: CLINIC | Age: 52
End: 2022-10-14

## 2022-10-14 NOTE — TELEPHONE ENCOUNTER
Called patient to discuss findings of CT results and provider recommendations.    Per Dr. León:  Result reviewed, at this time I see no evidence for recurrence of any manner of abdominal wall hernia.  Previous hernia repairs and mesh appear to be in good positions.  I do not have any explanation for the discomfort in the left lower abdomen/groin.  If this is severe enough consideration of pain management or Ortho evaluation given history of prior hip surgery     Discussed this with patient who verbalized understanding. She reports that she is going back to her ortho provider as she is pretty sure that is where the pain is originating from.  It was her PCP who had wanted her to see Dr. León first    Patient will call JESSENIA Lim RN-BSN

## 2023-04-23 ENCOUNTER — HEALTH MAINTENANCE LETTER (OUTPATIENT)
Age: 53
End: 2023-04-23

## 2023-08-08 NOTE — INTERVAL H&P NOTE
This note is for the purpose of making the H & P performed in clinic within the last 30 days available in the hospital surgical encounter.    
no distress

## 2023-09-23 ENCOUNTER — HEALTH MAINTENANCE LETTER (OUTPATIENT)
Age: 53
End: 2023-09-23

## 2024-06-29 ENCOUNTER — HEALTH MAINTENANCE LETTER (OUTPATIENT)
Age: 54
End: 2024-06-29

## 2024-11-16 ENCOUNTER — HEALTH MAINTENANCE LETTER (OUTPATIENT)
Age: 54
End: 2024-11-16

## 2025-07-13 ENCOUNTER — HEALTH MAINTENANCE LETTER (OUTPATIENT)
Age: 55
End: 2025-07-13

## (undated) DEVICE — LINEN TOWEL PACK X5 5464

## (undated) DEVICE — GOWN IMPERVIOUS SPECIALTY XLG/XLONG 32474

## (undated) DEVICE — ESU GROUND PAD UNIVERSAL W/O CORD

## (undated) DEVICE — PACK LAP CHOLE SLC15LCFSD

## (undated) DEVICE — PREP CHLORAPREP 26ML TINTED ORANGE  260815

## (undated) DEVICE — SU VICRYL 3-0 SH 27" J316H

## (undated) DEVICE — ESU CORD MONOPOLAR 10'  E0510

## (undated) DEVICE — SU VICRYL 0 UR-6 27" J603H

## (undated) DEVICE — BLADE CLIPPER 4406

## (undated) DEVICE — ENDO TROCAR FIRST ENTRY KII FIOS Z-THRD 05X100MM CTF03

## (undated) DEVICE — DRAPE BREAST/CHEST 29420

## (undated) DEVICE — SU VICRYL 4-0 PS-2 18" UND J496H

## (undated) DEVICE — ESU HOLDER LAP INST DISP PURPLE LONG 330MM H-PRO-330

## (undated) DEVICE — DEVICE SUTURE GRASPER TROCAR CLOSURE 14GA PMITCSG

## (undated) DEVICE — CATH TRAY FOLEY SURESTEP 16FR WDRAIN BAG STLK LATEX A300316A

## (undated) DEVICE — GLOVE PROTEXIS W/NEU-THERA 8.0  2D73TE80

## (undated) RX ORDER — ONDANSETRON 2 MG/ML
INJECTION INTRAMUSCULAR; INTRAVENOUS
Status: DISPENSED
Start: 2017-03-28

## (undated) RX ORDER — HYDROMORPHONE HYDROCHLORIDE 1 MG/ML
INJECTION, SOLUTION INTRAMUSCULAR; INTRAVENOUS; SUBCUTANEOUS
Status: DISPENSED
Start: 2017-03-28

## (undated) RX ORDER — DEXAMETHASONE SODIUM PHOSPHATE 4 MG/ML
INJECTION, SOLUTION INTRA-ARTICULAR; INTRALESIONAL; INTRAMUSCULAR; INTRAVENOUS; SOFT TISSUE
Status: DISPENSED
Start: 2017-03-28

## (undated) RX ORDER — PROPOFOL 10 MG/ML
INJECTION, EMULSION INTRAVENOUS
Status: DISPENSED
Start: 2017-03-28

## (undated) RX ORDER — GLYCOPYRROLATE 0.2 MG/ML
INJECTION, SOLUTION INTRAMUSCULAR; INTRAVENOUS
Status: DISPENSED
Start: 2017-03-28

## (undated) RX ORDER — LIDOCAINE HYDROCHLORIDE 20 MG/ML
INJECTION, SOLUTION EPIDURAL; INFILTRATION; INTRACAUDAL; PERINEURAL
Status: DISPENSED
Start: 2017-03-28

## (undated) RX ORDER — FENTANYL CITRATE 50 UG/ML
INJECTION, SOLUTION INTRAMUSCULAR; INTRAVENOUS
Status: DISPENSED
Start: 2017-03-28

## (undated) RX ORDER — CEFAZOLIN SODIUM 2 G/100ML
INJECTION, SOLUTION INTRAVENOUS
Status: DISPENSED
Start: 2017-03-28

## (undated) RX ORDER — HYDROCODONE BITARTRATE AND ACETAMINOPHEN 5; 325 MG/1; MG/1
TABLET ORAL
Status: DISPENSED
Start: 2017-03-28

## (undated) RX ORDER — BUPIVACAINE HYDROCHLORIDE AND EPINEPHRINE 2.5; 5 MG/ML; UG/ML
INJECTION, SOLUTION EPIDURAL; INFILTRATION; INTRACAUDAL; PERINEURAL
Status: DISPENSED
Start: 2017-03-28